# Patient Record
Sex: MALE | Race: WHITE | NOT HISPANIC OR LATINO
[De-identification: names, ages, dates, MRNs, and addresses within clinical notes are randomized per-mention and may not be internally consistent; named-entity substitution may affect disease eponyms.]

---

## 2019-03-27 ENCOUNTER — APPOINTMENT (OUTPATIENT)
Dept: INTERNAL MEDICINE | Facility: CLINIC | Age: 54
End: 2019-03-27

## 2021-11-19 ENCOUNTER — APPOINTMENT (OUTPATIENT)
Dept: CARDIOLOGY | Facility: CLINIC | Age: 56
End: 2021-11-19
Payer: COMMERCIAL

## 2021-11-19 ENCOUNTER — NON-APPOINTMENT (OUTPATIENT)
Age: 56
End: 2021-11-19

## 2021-11-19 VITALS
SYSTOLIC BLOOD PRESSURE: 149 MMHG | HEART RATE: 79 BPM | BODY MASS INDEX: 37.18 KG/M2 | WEIGHT: 251 LBS | DIASTOLIC BLOOD PRESSURE: 99 MMHG | HEIGHT: 69 IN | RESPIRATION RATE: 16 BRPM

## 2021-11-19 DIAGNOSIS — Z86.79 PERSONAL HISTORY OF OTHER DISEASES OF THE CIRCULATORY SYSTEM: ICD-10-CM

## 2021-11-19 DIAGNOSIS — Z78.9 OTHER SPECIFIED HEALTH STATUS: ICD-10-CM

## 2021-11-19 DIAGNOSIS — Z82.49 FAMILY HISTORY OF ISCHEMIC HEART DISEASE AND OTHER DISEASES OF THE CIRCULATORY SYSTEM: ICD-10-CM

## 2021-11-19 PROCEDURE — 93000 ELECTROCARDIOGRAM COMPLETE: CPT

## 2021-11-19 PROCEDURE — 99204 OFFICE O/P NEW MOD 45 MIN: CPT

## 2021-12-01 ENCOUNTER — APPOINTMENT (OUTPATIENT)
Dept: CARDIOLOGY | Facility: CLINIC | Age: 56
End: 2021-12-01
Payer: COMMERCIAL

## 2021-12-01 PROCEDURE — 93306 TTE W/DOPPLER COMPLETE: CPT

## 2022-01-03 ENCOUNTER — APPOINTMENT (OUTPATIENT)
Dept: CARDIOLOGY | Facility: CLINIC | Age: 57
End: 2022-01-03

## 2022-01-04 ENCOUNTER — APPOINTMENT (OUTPATIENT)
Dept: CARDIOLOGY | Facility: CLINIC | Age: 57
End: 2022-01-04

## 2022-02-07 ENCOUNTER — APPOINTMENT (OUTPATIENT)
Dept: CARDIOLOGY | Facility: CLINIC | Age: 57
End: 2022-02-07
Payer: COMMERCIAL

## 2022-02-07 PROCEDURE — A9500: CPT

## 2022-02-07 PROCEDURE — 78452 HT MUSCLE IMAGE SPECT MULT: CPT

## 2022-02-09 ENCOUNTER — APPOINTMENT (OUTPATIENT)
Dept: CARDIOLOGY | Facility: CLINIC | Age: 57
End: 2022-02-09

## 2022-02-11 ENCOUNTER — APPOINTMENT (OUTPATIENT)
Dept: CARDIOLOGY | Facility: CLINIC | Age: 57
End: 2022-02-11
Payer: COMMERCIAL

## 2022-02-11 ENCOUNTER — NON-APPOINTMENT (OUTPATIENT)
Age: 57
End: 2022-02-11

## 2022-02-11 VITALS
SYSTOLIC BLOOD PRESSURE: 134 MMHG | DIASTOLIC BLOOD PRESSURE: 94 MMHG | RESPIRATION RATE: 16 BRPM | HEART RATE: 75 BPM | WEIGHT: 252 LBS | HEIGHT: 69 IN | BODY MASS INDEX: 37.33 KG/M2

## 2022-02-11 DIAGNOSIS — I77.810 THORACIC AORTIC ECTASIA: ICD-10-CM

## 2022-02-11 DIAGNOSIS — R94.31 ABNORMAL ELECTROCARDIOGRAM [ECG] [EKG]: ICD-10-CM

## 2022-02-11 PROCEDURE — 99214 OFFICE O/P EST MOD 30 MIN: CPT

## 2022-02-11 PROCEDURE — 93000 ELECTROCARDIOGRAM COMPLETE: CPT

## 2022-02-11 NOTE — DISCUSSION/SUMMARY
[FreeTextEntry1] : 1).  Patient's echocardiogram revealed some mild aortic root/ascending aorta dilation with impaired diastolic filling.  There was preserved LV function and no significant valvulopathy noted.\par \par His at-home blood pressures are apparently much better averaging in the 120s over 70s since beginning Irebesartan-HCTZ 150-12.5 mg back in November.  Today's in-office BP elevated but he reports having an element of white coat hypertension.\par \par Been compliant with increased Carvedilol 20 mg daily as well.\par \par Continue with current medical regimen, will consider titrating up Irbesartan-HCTZ if BP remains elevated at follow up.  Continue to monitor BP at home 3 x week, approximately 2-3 hours after taking A.M. meds and bring log to f/u.  Bring home BP cuff to follow up for calibration as well.\par \par 2).  Patient reassured the nuclear stress test did not reveal any signs of coronary ischemia and the 1 week Event monitor did not reveal any significant arrhythmias.  Resting blood pressure was elevated while off Carvedilol at (142/100).\par \par 3).  Diet and lifestyle modification discussed including low sodium, low fat and low carbohydrate weight reducing diet.  Patient is to implement aerobic exercise regimen few days per week as tolerated. \par \par 4).  Follow up with PCP (Dr. Ortega) regarding routine checkups and blood work.  Forward all testing/lab work to our office. \par \par 5).  No additional cardiac testing indicated at this time. \par \par 6).  Recommend patient report any untoward symptoms. \par \par 7).  Follow up with Dr. Lorenzo 3-4 months or PRN.

## 2022-02-11 NOTE — HISTORY OF PRESENT ILLNESS
[FreeTextEntry1] : Tolerating current cardiac medical regimen without difficulty including Irbesartan-HCTZ 150-12.5 mg QD and Carvedilol 20 mg QD;\par \par Suffers from multiple somatic complaints which includes prior spinal fusion surgery, chronic severe left shoulder pain and bilateral torn ACLs after sustaining work injury where he fell over 8 feet through a drop ceiling.  Will consider Orthopedic follow up to discuss shoulder and knee intervention;\par \par 1 week Event monitor (11/12 to 11/29/21):  Presenting rhythm was sinus with average heart rate 77 bpm (Max 120, Min 51).  There were no episodes of atrial fibrillation, SVT, VT, pauses or AV blocks.  There were occasional PVCs and PACs with <1% burden.  There were 3 patient events recorded for (heart racing) which correlated with NSR in the 70s to 90s;\par \par Transthoracic Echocardiogram (12/1/2021):  Mild ascending aorta dilation (4.0 cm) and aortic root dilation (3.7 cm). Normal LV systolic function with an LVEF of 55 to 60%.  There is impaired relaxation pattern of LV diastolic filling.  Mild to moderate AR. The left and right atria normal in size and structure. Trace MR and ME;\par \par Pharmacologic Nuclear Stress test (2/7/2022):  Patient developed SOB during stress that resolved with IV aminophylline.  Resting blood pressure was (142/100) while off Carvedilol.  Patient developed no dysrhythmias during exercise. EKG was negative for ischemia. Normal myocardial perfusion imaging with artifact; LVEF of 53%. ie- negative study;

## 2022-02-11 NOTE — ASSESSMENT
[FreeTextEntry1] : EKG 2/11/2022:  The EKG illustrates sinus rhythm, rate of 75 bpm, nonspecific T wave abnormality.  Essentially unchanged.

## 2022-02-11 NOTE — REASON FOR VISIT
[FreeTextEntry1] : Patient is a 56-year-old male with longstanding history of intermittent palpitations of the chest and longstanding history of hypertension. He had a workup many years ago (9-10 years) for his symptoms and abnormal EKG and apparently had a cardiac catheterization which demonstrated (normal coronary arteries). There is a strong family history for heart disease especially for his father who  of age 42 for an apparent MI;\par \par He came to our office last November for consultation in regards to increasing palpitations and elevated blood pressures.  Admits his palpitations have improved considerably since that time after increasing daily hydration.  Also, his PCP started him on Irbesartan-HCTZ 150-12.5 mg daily with recent home blood pressures averaging in the 120s over 70s;\par \par Patient completed 1 week Event monitor, Transthoracic Echocardiogram and Nuclear Stress test and is back today to review those results;\par \par He denies CP, SOB, ANTHONY, presyncope, syncope, edema.

## 2022-02-11 NOTE — PHYSICAL EXAM
[Well Developed] : well developed [No Acute Distress] : no acute distress [Obese] : obese [Normal Conjunctiva] : normal conjunctiva [Normal Venous Pressure] : normal venous pressure [No Carotid Bruit] : no carotid bruit [Normal S1, S2] : normal S1, S2 [No Rub] : no rub [No Gallop] : no gallop [Murmur] : murmur [Clear Lung Fields] : clear lung fields [Good Air Entry] : good air entry [No Respiratory Distress] : no respiratory distress  [Soft] : abdomen soft [Non Tender] : non-tender [No Masses/organomegaly] : no masses/organomegaly [Normal Gait] : normal gait [No Edema] : no edema [No Cyanosis] : no cyanosis [No Clubbing] : no clubbing [No Rash] : no rash [No Skin Lesions] : no skin lesions [Moves all extremities] : moves all extremities [No Focal Deficits] : no focal deficits [Normal Speech] : normal speech [Alert and Oriented] : alert and oriented [Normal memory] : normal memory [de-identified] : Grade I/VI systolic murmur

## 2022-05-12 RX ORDER — KIT FOR THE PREPARATION OF TECHNETIUM TC99M SESTAMIBI 1 MG/5ML
INJECTION, POWDER, LYOPHILIZED, FOR SOLUTION PARENTERAL
Refills: 0 | Status: COMPLETED | OUTPATIENT
Start: 2022-05-12

## 2022-05-12 RX ADMIN — KIT FOR THE PREPARATION OF TECHNETIUM TC99M SESTAMIBI 0: 1 INJECTION, POWDER, LYOPHILIZED, FOR SOLUTION PARENTERAL at 00:00

## 2022-06-03 ENCOUNTER — APPOINTMENT (OUTPATIENT)
Dept: CARDIOLOGY | Facility: CLINIC | Age: 57
End: 2022-06-03

## 2023-01-03 ENCOUNTER — EMERGENCY (EMERGENCY)
Facility: HOSPITAL | Age: 58
LOS: 1 days | Discharge: HOME | End: 2023-01-03
Attending: EMERGENCY MEDICINE
Payer: COMMERCIAL

## 2023-01-03 VITALS
WEIGHT: 258.16 LBS | HEART RATE: 72 BPM | OXYGEN SATURATION: 99 % | HEIGHT: 69 IN | DIASTOLIC BLOOD PRESSURE: 100 MMHG | SYSTOLIC BLOOD PRESSURE: 192 MMHG | RESPIRATION RATE: 18 BRPM | TEMPERATURE: 98 F

## 2023-01-03 VITALS
RESPIRATION RATE: 18 BRPM | SYSTOLIC BLOOD PRESSURE: 133 MMHG | HEART RATE: 62 BPM | TEMPERATURE: 98 F | DIASTOLIC BLOOD PRESSURE: 73 MMHG | OXYGEN SATURATION: 99 %

## 2023-01-03 LAB
ALBUMIN SERPL ELPH-MCNC: 4.5 G/DL — SIGNIFICANT CHANGE UP (ref 3.3–5.2)
ALP SERPL-CCNC: 110 U/L — SIGNIFICANT CHANGE UP (ref 40–120)
ALT FLD-CCNC: 53 U/L — HIGH
ANION GAP SERPL CALC-SCNC: 11 MMOL/L — SIGNIFICANT CHANGE UP (ref 5–17)
AST SERPL-CCNC: 40 U/L — HIGH
BASOPHILS # BLD AUTO: 0.02 K/UL — SIGNIFICANT CHANGE UP (ref 0–0.2)
BASOPHILS NFR BLD AUTO: 0.3 % — SIGNIFICANT CHANGE UP (ref 0–2)
BILIRUB SERPL-MCNC: 0.5 MG/DL — SIGNIFICANT CHANGE UP (ref 0.4–2)
BUN SERPL-MCNC: 15.6 MG/DL — SIGNIFICANT CHANGE UP (ref 8–20)
CALCIUM SERPL-MCNC: 9.3 MG/DL — SIGNIFICANT CHANGE UP (ref 8.4–10.5)
CHLORIDE SERPL-SCNC: 104 MMOL/L — SIGNIFICANT CHANGE UP (ref 96–108)
CO2 SERPL-SCNC: 26 MMOL/L — SIGNIFICANT CHANGE UP (ref 22–29)
CREAT SERPL-MCNC: 1.11 MG/DL — SIGNIFICANT CHANGE UP (ref 0.5–1.3)
EGFR: 77 ML/MIN/1.73M2 — SIGNIFICANT CHANGE UP
EOSINOPHIL # BLD AUTO: 0.09 K/UL — SIGNIFICANT CHANGE UP (ref 0–0.5)
EOSINOPHIL NFR BLD AUTO: 1.2 % — SIGNIFICANT CHANGE UP (ref 0–6)
GLUCOSE SERPL-MCNC: 96 MG/DL — SIGNIFICANT CHANGE UP (ref 70–99)
HCT VFR BLD CALC: 43.4 % — SIGNIFICANT CHANGE UP (ref 39–50)
HGB BLD-MCNC: 14.9 G/DL — SIGNIFICANT CHANGE UP (ref 13–17)
IMM GRANULOCYTES NFR BLD AUTO: 0.8 % — SIGNIFICANT CHANGE UP (ref 0–0.9)
LYMPHOCYTES # BLD AUTO: 1.6 K/UL — SIGNIFICANT CHANGE UP (ref 1–3.3)
LYMPHOCYTES # BLD AUTO: 21 % — SIGNIFICANT CHANGE UP (ref 13–44)
MCHC RBC-ENTMCNC: 29.4 PG — SIGNIFICANT CHANGE UP (ref 27–34)
MCHC RBC-ENTMCNC: 34.3 GM/DL — SIGNIFICANT CHANGE UP (ref 32–36)
MCV RBC AUTO: 85.6 FL — SIGNIFICANT CHANGE UP (ref 80–100)
MONOCYTES # BLD AUTO: 0.76 K/UL — SIGNIFICANT CHANGE UP (ref 0–0.9)
MONOCYTES NFR BLD AUTO: 10 % — SIGNIFICANT CHANGE UP (ref 2–14)
NEUTROPHILS # BLD AUTO: 5.09 K/UL — SIGNIFICANT CHANGE UP (ref 1.8–7.4)
NEUTROPHILS NFR BLD AUTO: 66.7 % — SIGNIFICANT CHANGE UP (ref 43–77)
PLATELET # BLD AUTO: 148 K/UL — LOW (ref 150–400)
POTASSIUM SERPL-MCNC: 3.9 MMOL/L — SIGNIFICANT CHANGE UP (ref 3.5–5.3)
POTASSIUM SERPL-SCNC: 3.9 MMOL/L — SIGNIFICANT CHANGE UP (ref 3.5–5.3)
PROT SERPL-MCNC: 7 G/DL — SIGNIFICANT CHANGE UP (ref 6.6–8.7)
RBC # BLD: 5.07 M/UL — SIGNIFICANT CHANGE UP (ref 4.2–5.8)
RBC # FLD: 13.7 % — SIGNIFICANT CHANGE UP (ref 10.3–14.5)
SODIUM SERPL-SCNC: 141 MMOL/L — SIGNIFICANT CHANGE UP (ref 135–145)
TROPONIN T SERPL-MCNC: <0.01 NG/ML — SIGNIFICANT CHANGE UP (ref 0–0.06)
TSH SERPL-MCNC: 2.59 UIU/ML — SIGNIFICANT CHANGE UP (ref 0.27–4.2)
WBC # BLD: 7.62 K/UL — SIGNIFICANT CHANGE UP (ref 3.8–10.5)
WBC # FLD AUTO: 7.62 K/UL — SIGNIFICANT CHANGE UP (ref 3.8–10.5)

## 2023-01-03 PROCEDURE — 84443 ASSAY THYROID STIM HORMONE: CPT

## 2023-01-03 PROCEDURE — 80053 COMPREHEN METABOLIC PANEL: CPT

## 2023-01-03 PROCEDURE — 99285 EMERGENCY DEPT VISIT HI MDM: CPT | Mod: 25

## 2023-01-03 PROCEDURE — 71046 X-RAY EXAM CHEST 2 VIEWS: CPT

## 2023-01-03 PROCEDURE — 99285 EMERGENCY DEPT VISIT HI MDM: CPT

## 2023-01-03 PROCEDURE — 71046 X-RAY EXAM CHEST 2 VIEWS: CPT | Mod: 26

## 2023-01-03 PROCEDURE — 84484 ASSAY OF TROPONIN QUANT: CPT

## 2023-01-03 PROCEDURE — 85025 COMPLETE CBC W/AUTO DIFF WBC: CPT

## 2023-01-03 PROCEDURE — 93005 ELECTROCARDIOGRAM TRACING: CPT

## 2023-01-03 PROCEDURE — 93010 ELECTROCARDIOGRAM REPORT: CPT

## 2023-01-03 PROCEDURE — 36415 COLL VENOUS BLD VENIPUNCTURE: CPT

## 2023-01-03 NOTE — ED ADULT NURSE NOTE - NSHOSCREENINGQ1_ED_ALL_ED
----- Message from Reshma Roberson PA-C sent at 3/11/2022 10:35 AM CST -----  BMP- Normal kidney function and blood sugar  Ferritin- normal but low end of normal, recommend multivitamin with iron  FLP- fabulous, repeat in 3-5 years  CBC- no anemia   No

## 2023-01-03 NOTE — ED PROVIDER NOTE - NSFOLLOWUPINSTRUCTIONS_ED_ALL_ED_FT
- Follow up with your doctor within 2-3 days.   - Follow up with Cardiology, call in the morning for an appointment.  - Bring results with you to the appointment.   - Return to the ED for any new or worsening symptoms.     Palpitations    A palpitation is the feeling that your heartbeat is irregular or is faster than normal. It may feel like your heart is fluttering or skipping a beat. They may be caused by many things, including smoking, caffeine, alcohol, stress, and certain medicines. Although most causes of palpitations are not serious, palpitations can be a sign of a serious medical problem. Avoid caffeine, alcohol, and tobacco products at home. Try to reduce stress and anxiety and make sure to get plenty of rest.     SEEK IMMEDIATE MEDICAL CARE IF YOU HAVE ANY OF THE FOLLOWING SYMPTOMS: chest pain, shortness of breath, severe headache, dizziness/lightheadedness, or fainting.

## 2023-01-03 NOTE — ED PROVIDER NOTE - PROGRESS NOTE DETAILS
Placido DE LA TORRE: Patient asymptomatic and remains in NSR.  Reviewed results with patient, shared decision making model employed offered observation stay for cardiac monitoring and cardiology eval versus outpatient cardiology evaluation.  Risk benefits discussed.  Patient and significant other prefer outpatient follow-up.  Has scheduled PMD appointment later this week and will call cardiologist in the morning for close follow-up.  Provided with copy of results and EKG.  Return precautions given.

## 2023-01-03 NOTE — ED ADULT NURSE NOTE - OBJECTIVE STATEMENT
pt reports of palpitations on Fri that went away and came back today while pt was at rest. pt also adds having dizziness. hx of hypertension, denies blood thinner use. denies headache, shortness of breath, swelling of extremities. pt attached to cardiac monitor and pulse ox. family at bedside. gcs15 aox4. sinus rhythm on monitor. HR64

## 2023-01-03 NOTE — ED PROVIDER NOTE - NS ED ROS FT
Constitutional: no fever, no sweats, and no chills.  CV: no chest pain, no edema. +palpitations  Resp: no cough, no dyspnea  GI: no abdominal pain, no nausea and no vomiting.  MSK: no msk pain, no weakness  Skin: no lesions, and no rashes.  Neuro: no LOC, no headache, no dizziness  ROS otherwise negative except as noted in HPI.

## 2023-01-03 NOTE — ED PROVIDER NOTE - OBJECTIVE STATEMENT
58yo M w/pmh palpitations presents for palpitations. Reports an episode for 2 hours on Friday, then again today onset 3pm, still present on arrival. Reports he has had similar symptoms in the past, but usually one episode in several months, never this close together. Denies f/ch, CP, SOB, n/v, abdominal pain. Sees Dr. Lorenzo from [facility] for cardiology, stress and echo last done a year ago, mild AR, otherwise unremarkable. Denies cardiac hx.

## 2023-01-03 NOTE — ED PROVIDER NOTE - ATTENDING CONTRIBUTION TO CARE
Placido: I performed a face to face bedside interview with patient regarding history of present illness, review of symptoms and past medical history. I completed an independent physical exam.  I have discussed patient's plan of care with resident.   I agree with note as stated above including HISTORY OF PRESENT ILLNESS, HIV, PAST MEDICAL/SURGICAL/FAMILY/SOCIAL HISTORY, ALLERGIES AND HOME MEDICATIONS, REVIEW OF SYSTEMS, PHYSICAL EXAM, MEDICAL DECISION MAKING and any PROGRESS NOTES during the time I functioned as the attending physician for this patient unless otherwise noted. My brief assessment is as follows: 57-year-old history of palpitations presents with intermittent Palpitations timesx4 days.  Feels anxious and occasionally lightheaded with the palpitations.  Denies chest pain, shortness of breath, nausea, vomiting, abdominal pain, diaphoresis.  Sees Dr. Lorenzo from cardiology, negative stress echo and Holter last year.  Baseline abnormal EKG with T wave inversions V1 to 3 in 2009, today with T wave inversions V1 through 6.  Patient states he always has abnormalities but does not know if this is new or old.   Gen: Well appearing in NAD  Head: NC/AT  Neck: trachea midline  Resp:  No distress, CTAB  CV: RRR  GI: soft, NTND  Ext: no deformities, no calf ttp, no LE edema  Neuro:  A&O appears non focal  Skin:  Warm and dry as visualized  Psych:  Normal affect and mood  Patient with palpitations currently normal sinus rhythm.  Lab, cardiac monitor, troponin, chest x-ray and reassess.

## 2023-01-03 NOTE — ED PROVIDER NOTE - PATIENT PORTAL LINK FT
You can access the FollowMyHealth Patient Portal offered by [facility] by registering at the following website: [email or web address]. By joining [facility]’s FollowMyHealth portal, you will also be able to view your health information using other applications (apps) compatible with our system.

## 2023-01-03 NOTE — ED PROVIDER NOTE - CLINICAL SUMMARY MEDICAL DECISION MAKING FREE TEXT BOX
58yo M w/pmh palpitations presents for palpitations. EKG shows new T wave inversions compared to previous EKG from 2009, no more recent EKG found. Labs, CXR pending.

## 2023-01-05 ENCOUNTER — NON-APPOINTMENT (OUTPATIENT)
Age: 58
End: 2023-01-05

## 2023-01-05 ENCOUNTER — APPOINTMENT (OUTPATIENT)
Dept: CARDIOLOGY | Facility: CLINIC | Age: 58
End: 2023-01-05
Payer: COMMERCIAL

## 2023-01-05 VITALS
HEART RATE: 73 BPM | OXYGEN SATURATION: 99 % | WEIGHT: 257 LBS | RESPIRATION RATE: 16 BRPM | SYSTOLIC BLOOD PRESSURE: 154 MMHG | HEIGHT: 69 IN | BODY MASS INDEX: 38.06 KG/M2 | DIASTOLIC BLOOD PRESSURE: 90 MMHG

## 2023-01-05 DIAGNOSIS — R00.2 PALPITATIONS: ICD-10-CM

## 2023-01-05 PROCEDURE — 93000 ELECTROCARDIOGRAM COMPLETE: CPT

## 2023-01-05 PROCEDURE — 99215 OFFICE O/P EST HI 40 MIN: CPT | Mod: 25

## 2023-01-05 RX ORDER — NAPROXEN 500 MG/1
500 TABLET ORAL
Refills: 0 | Status: DISCONTINUED | COMMUNITY
End: 2023-01-05

## 2023-01-05 RX ORDER — METAXALONE 800 MG/1
800 TABLET ORAL 3 TIMES DAILY
Refills: 0 | Status: DISCONTINUED | COMMUNITY
End: 2023-01-05

## 2023-01-05 NOTE — ASSESSMENT
[FreeTextEntry1] : 57-year-old male with longstanding history of intermittent palpitations of the chest and longstanding history of hypertension. He had a workup many years ago (9-10 years) for his symptoms and abnormal EKG and apparently had a cardiac catheterization which demonstrated (normal coronary arteries). There is a strong family history for heart disease especially for his father who  of age 42 for an apparent MI;\par \par Patient comes to the office for a HFU, he was recently seen at [facility] with complains of palpitations which he endorses to high consumption of coffee around the holidays\par \par Patient denies chest pain, no palpitations, no ANTHONY, no PND, no orthopnea, no leg edema,  no claudication, no syncope.\par \par \par #Hospital follow up\par asymptomatic \par \par #HTN \par well controlled\par continue with current medications \par Patient instructed to monitor BP at home and bring log to f/u.\par \par #PVCs\par with ongoing palpitations, old problem chronic \par \par RTC in 4 months

## 2023-01-05 NOTE — HISTORY OF PRESENT ILLNESS
[FreeTextEntry1] : 57-year-old male with longstanding history of intermittent palpitations of the chest and longstanding history of hypertension. He had a workup many years ago (9-10 years) for his symptoms and abnormal EKG and apparently had a cardiac catheterization which demonstrated (normal coronary arteries). There is a strong family history for heart disease especially for his father who  of age 42 for an apparent MI;\par \par Patient comes to the office for a HFU, he was recently seen at [facility] with complains of palpitations which he endorses to high consumption of coffee around the holidays\par \par Patient denies chest pain, no palpitations, no ANTHONY, no PND, no orthopnea, no leg edema,  no claudication, no syncope.\par  \par

## 2023-01-05 NOTE — CARDIOLOGY SUMMARY
[de-identified] : 1/5/22: TORI ALVAREZ v3 [de-identified] : (12/1/2021): Mild ascending aorta dilation (4.0 cm) and aortic root dilation (3.7 cm). Normal LV systolic function with an LVEF of 55 to 60%. There is impaired relaxation pattern of LV diastolic filling. Mild to moderate AR. The left and right atria normal in size and structure. Trace MR and IA;\par  [de-identified] : Pharmacologic Nuclear Stress test (2/7/2022): Patient developed SOB during stress that resolved with IV aminophylline. Resting blood pressure was (142/100) while off Carvedilol. Patient developed no dysrhythmias during exercise. EKG was negative for ischemia. Normal myocardial perfusion imaging with artifact; LVEF of 53%. ie- negative study;  [de-identified] : (12/1/2021): Mild ascending aorta dilation (4.0 cm) and aortic root dilation (3.7 cm). Normal LV systolic function with an LVEF of 55 to 60%. There is impaired relaxation pattern of LV diastolic filling. Mild to moderate AR. The left and right atria normal in size and structure. Trace MR and VA;\par

## 2023-01-06 ENCOUNTER — APPOINTMENT (OUTPATIENT)
Dept: FAMILY MEDICINE | Facility: CLINIC | Age: 58
End: 2023-01-06
Payer: COMMERCIAL

## 2023-01-06 ENCOUNTER — NON-APPOINTMENT (OUTPATIENT)
Age: 58
End: 2023-01-06

## 2023-01-06 VITALS
TEMPERATURE: 97.2 F | HEART RATE: 74 BPM | OXYGEN SATURATION: 98 % | SYSTOLIC BLOOD PRESSURE: 150 MMHG | DIASTOLIC BLOOD PRESSURE: 90 MMHG | RESPIRATION RATE: 14 BRPM

## 2023-01-06 DIAGNOSIS — Z23 ENCOUNTER FOR IMMUNIZATION: ICD-10-CM

## 2023-01-06 DIAGNOSIS — Z00.00 ENCOUNTER FOR GENERAL ADULT MEDICAL EXAMINATION W/OUT ABNORMAL FINDINGS: ICD-10-CM

## 2023-01-06 DIAGNOSIS — I49.3 VENTRICULAR PREMATURE DEPOLARIZATION: ICD-10-CM

## 2023-01-06 PROCEDURE — 90714 TD VACC NO PRESV 7 YRS+ IM: CPT

## 2023-01-06 PROCEDURE — 36415 COLL VENOUS BLD VENIPUNCTURE: CPT

## 2023-01-06 PROCEDURE — 90471 IMMUNIZATION ADMIN: CPT

## 2023-01-06 PROCEDURE — G0444 DEPRESSION SCREEN ANNUAL: CPT | Mod: 59

## 2023-01-06 PROCEDURE — 99386 PREV VISIT NEW AGE 40-64: CPT | Mod: 25

## 2023-01-06 NOTE — HEALTH RISK ASSESSMENT
[Never] : Never [No] : In the past 12 months have you used drugs other than those required for medical reasons? No [0] : 2) Feeling down, depressed, or hopeless: Not at all (0) [PHQ-2 Negative - No further assessment needed] : PHQ-2 Negative - No further assessment needed [With Family] : lives with family [Retired] : retired [Significant Other] : lives with significant other [# Of Children ___] : has [unfilled] children [[id number]] : 0

## 2023-01-06 NOTE — HISTORY OF PRESENT ILLNESS
[FreeTextEntry1] : Pt is here for establish care. [de-identified] : 58 y/o male with pmhx of HTN presents for new patient CPE. Patient recently went to [facility] for palpitatons. Has history of PVCs and was drinking a lot of caffeine around the holidays.

## 2023-01-06 NOTE — ASSESSMENT
[FreeTextEntry1] : CPE:\par -will order CBC w/ diff, CMP, Lipid, TSH and HgbA1c, labs to be drawn in office\par \par Screening:\par -Colon Cancer screening: had with Dr. Ortega, will obtain records\par \par Vaccinations:\par Seasonal flu  - recommended, declined\par Tdap - administered today, tolerated well\par Shingles - recommended, declined\par \par -previous notes and labs reviewed, consultants notes reviewed\par -patient expressed understanding of plan, all questions answered\par \par HTN:\par patient with BP of 132/88 at today's visit\par -will continue current medication regimen of carvedilol ER 20mg daily and irbesartan-hctz 150-12.5mg daily \par -Patient advised to continue following a healthy meal plan with less salt and more fruits and vegetables, in addition to increasing physical activity.\par -checks BP at home and gets numbers in 120/80 - 130/80\par -patient to follow up in 6 months or as needed.\par \par PVCs\par chronic, stable\par follow up with cardio as needed

## 2023-01-06 NOTE — REVIEW OF SYSTEMS
[Fever] : no fever [Chills] : no chills [Vision Problems] : no vision problems [Chest Pain] : no chest pain [Palpitations] : no palpitations [Lower Ext Edema] : no lower extremity edema [Shortness Of Breath] : no shortness of breath [Wheezing] : no wheezing [Cough] : no cough [Abdominal Pain] : no abdominal pain [Nausea] : no nausea [Constipation] : no constipation [Diarrhea] : no diarrhea [Vomiting] : no vomiting [Dysuria] : no dysuria [Hematuria] : no hematuria [Headache] : no headache [Dizziness] : no dizziness [Anxiety] : no anxiety [Depression] : no depression

## 2023-01-09 ENCOUNTER — NON-APPOINTMENT (OUTPATIENT)
Age: 58
End: 2023-01-09

## 2023-01-09 LAB
ALBUMIN SERPL ELPH-MCNC: 4.1 G/DL
ALP BLD-CCNC: 145 U/L
ALT SERPL-CCNC: 48 U/L
ANION GAP SERPL CALC-SCNC: 13 MMOL/L
AST SERPL-CCNC: 32 U/L
BASOPHILS # BLD AUTO: 0.03 K/UL
BASOPHILS NFR BLD AUTO: 0.5 %
BILIRUB SERPL-MCNC: 0.3 MG/DL
BUN SERPL-MCNC: 19 MG/DL
CALCIUM SERPL-MCNC: 9.5 MG/DL
CHLORIDE SERPL-SCNC: 103 MMOL/L
CHOLEST SERPL-MCNC: 201 MG/DL
CO2 SERPL-SCNC: 24 MMOL/L
CREAT SERPL-MCNC: 1.16 MG/DL
EGFR: 73 ML/MIN/1.73M2
EOSINOPHIL # BLD AUTO: 0.09 K/UL
EOSINOPHIL NFR BLD AUTO: 1.5 %
ESTIMATED AVERAGE GLUCOSE: 126 MG/DL
GLUCOSE SERPL-MCNC: 115 MG/DL
HBA1C MFR BLD HPLC: 6 %
HCT VFR BLD CALC: 45.2 %
HDLC SERPL-MCNC: 39 MG/DL
HGB BLD-MCNC: 14.7 G/DL
IMM GRANULOCYTES NFR BLD AUTO: 1 %
LDLC SERPL CALC-MCNC: 132 MG/DL
LYMPHOCYTES # BLD AUTO: 0.94 K/UL
LYMPHOCYTES NFR BLD AUTO: 15.5 %
MAN DIFF?: NORMAL
MCHC RBC-ENTMCNC: 29.1 PG
MCHC RBC-ENTMCNC: 32.5 GM/DL
MCV RBC AUTO: 89.3 FL
MONOCYTES # BLD AUTO: 0.63 K/UL
MONOCYTES NFR BLD AUTO: 10.4 %
NEUTROPHILS # BLD AUTO: 4.31 K/UL
NEUTROPHILS NFR BLD AUTO: 71.1 %
NONHDLC SERPL-MCNC: 162 MG/DL
PLATELET # BLD AUTO: 144 K/UL
POTASSIUM SERPL-SCNC: 4.1 MMOL/L
PROT SERPL-MCNC: 6.8 G/DL
RBC # BLD: 5.06 M/UL
RBC # FLD: 14.1 %
SODIUM SERPL-SCNC: 140 MMOL/L
TRIGL SERPL-MCNC: 149 MG/DL
TSH SERPL-ACNC: 2.49 UIU/ML
WBC # FLD AUTO: 6.06 K/UL

## 2023-01-09 RX ORDER — ROSUVASTATIN CALCIUM 5 MG/1
5 TABLET, FILM COATED ORAL
Qty: 90 | Refills: 0 | Status: DISCONTINUED | COMMUNITY
Start: 2022-08-25

## 2023-01-20 ENCOUNTER — OFFICE (OUTPATIENT)
Dept: URBAN - METROPOLITAN AREA CLINIC 109 | Facility: CLINIC | Age: 58
Setting detail: OPHTHALMOLOGY
End: 2023-01-20
Payer: COMMERCIAL

## 2023-01-20 DIAGNOSIS — H25.13: ICD-10-CM

## 2023-01-20 DIAGNOSIS — H25.12: ICD-10-CM

## 2023-01-20 DIAGNOSIS — H40.013: ICD-10-CM

## 2023-01-20 PROCEDURE — 99214 OFFICE O/P EST MOD 30 MIN: CPT | Performed by: OPHTHALMOLOGY

## 2023-01-20 PROCEDURE — 92136 OPHTHALMIC BIOMETRY: CPT | Performed by: OPHTHALMOLOGY

## 2023-01-20 ASSESSMENT — REFRACTION_CURRENTRX
OD_AXIS: 98
OS_CYLINDER: -0.50
OS_AXIS: 88
OD_CYLINDER: -0.50
OS_OVR_VA: 20/
OD_OVR_VA: 20/
OD_SPHERE: -0.50
OS_SPHERE: -0.25

## 2023-01-20 ASSESSMENT — REFRACTION_MANIFEST
OS_AXIS: 94
OD_CYLINDER: -0.75
OS_SPHERE: +0.50
OD_AXIS: 66
OD_SPHERE: +0.25
OS_CYLINDER: -0.50

## 2023-01-20 ASSESSMENT — KERATOMETRY
OD_K1POWER_DIOPTERS: 43.00
OD_AXISANGLE2_DEGREES: 153
OS_AXISANGLE_DEGREES: 13
OD_K2POWER_DIOPTERS: 43.62
OS_K1POWER_DIOPTERS: 42.62
OS_AXISANGLE_DEGREES: 103
OS_CYLPOWER_DEGREES: 0.63
OD_AXISANGLE_DEGREES: 153
OS_K2POWER_DIOPTERS: 43.25
OS_CYLAXISANGLE_DEGREES: 13
OS_K1POWER_DIOPTERS: 42.62
OD_AXISANGLE_DEGREES: 63
OD_K1K2_AVERAGE: 43.31
OD_CYLPOWER_DEGREES: 0.62
OD_K2POWER_DIOPTERS: 43.62
OS_K2POWER_DIOPTERS: 43.25
OS_AXISANGLE2_DEGREES: 13
OD_K1POWER_DIOPTERS: 43.00
OD_CYLAXISANGLE_DEGREES: 153
OS_K1K2_AVERAGE: 42.935

## 2023-01-20 ASSESSMENT — CONFRONTATIONAL VISUAL FIELD TEST (CVF)
OS_FINDINGS: FULL
OD_FINDINGS: FULL

## 2023-01-20 ASSESSMENT — SPHEQUIV_DERIVED
OD_SPHEQUIV: -0.125
OS_SPHEQUIV: 0.25
OD_SPHEQUIV: -0.125
OS_SPHEQUIV: 0.125

## 2023-01-20 ASSESSMENT — AXIALLENGTH_DERIVED
OS_AL: 23.7518
OD_AL: 23.711
OD_AL: 23.711
OS_AL: 23.7025

## 2023-01-20 ASSESSMENT — TONOMETRY
OD_IOP_MMHG: 20
OS_IOP_MMHG: 20

## 2023-01-20 ASSESSMENT — REFRACTION_AUTOREFRACTION
OD_AXIS: 76
OD_CYLINDER: -0.75
OS_AXIS: 86
OS_SPHERE: +0.50
OD_SPHERE: +0.25
OS_CYLINDER: -0.75

## 2023-01-20 ASSESSMENT — VISUAL ACUITY
OD_BCVA: 20/30
OS_BCVA: 20/30-2

## 2023-01-30 ENCOUNTER — APPOINTMENT (OUTPATIENT)
Dept: FAMILY MEDICINE | Facility: CLINIC | Age: 58
End: 2023-01-30
Payer: COMMERCIAL

## 2023-01-30 VITALS
BODY MASS INDEX: 37.03 KG/M2 | OXYGEN SATURATION: 99 % | SYSTOLIC BLOOD PRESSURE: 134 MMHG | TEMPERATURE: 97.6 F | HEIGHT: 69 IN | HEART RATE: 73 BPM | DIASTOLIC BLOOD PRESSURE: 80 MMHG | WEIGHT: 250 LBS | RESPIRATION RATE: 14 BRPM

## 2023-01-30 DIAGNOSIS — H26.9 UNSPECIFIED CATARACT: ICD-10-CM

## 2023-01-30 PROCEDURE — 99214 OFFICE O/P EST MOD 30 MIN: CPT | Mod: 25

## 2023-01-30 PROCEDURE — 36415 COLL VENOUS BLD VENIPUNCTURE: CPT

## 2023-01-31 ENCOUNTER — TRANSCRIPTION ENCOUNTER (OUTPATIENT)
Age: 58
End: 2023-01-31

## 2023-01-31 LAB
25(OH)D3 SERPL-MCNC: 20.6 NG/ML
ALBUMIN SERPL ELPH-MCNC: 4.7 G/DL
ALP BLD-CCNC: 117 U/L
ALT SERPL-CCNC: 52 U/L
ANION GAP SERPL CALC-SCNC: 12 MMOL/L
AST SERPL-CCNC: 48 U/L
BASOPHILS # BLD AUTO: 0.02 K/UL
BASOPHILS NFR BLD AUTO: 0.4 %
BILIRUB SERPL-MCNC: 0.6 MG/DL
BUN SERPL-MCNC: 19 MG/DL
CALCIUM SERPL-MCNC: 9.7 MG/DL
CHLORIDE SERPL-SCNC: 103 MMOL/L
CO2 SERPL-SCNC: 25 MMOL/L
CREAT SERPL-MCNC: 1.15 MG/DL
EGFR: 74 ML/MIN/1.73M2
EOSINOPHIL # BLD AUTO: 0.05 K/UL
EOSINOPHIL NFR BLD AUTO: 1 %
FOLATE SERPL-MCNC: 16.4 NG/ML
GLUCOSE SERPL-MCNC: 103 MG/DL
HCT VFR BLD CALC: 44.7 %
HGB BLD-MCNC: 14.8 G/DL
IMM GRANULOCYTES NFR BLD AUTO: 0.4 %
LYMPHOCYTES # BLD AUTO: 1.01 K/UL
LYMPHOCYTES NFR BLD AUTO: 19.6 %
MAN DIFF?: NORMAL
MCHC RBC-ENTMCNC: 29.4 PG
MCHC RBC-ENTMCNC: 33.1 GM/DL
MCV RBC AUTO: 88.7 FL
MONOCYTES # BLD AUTO: 0.57 K/UL
MONOCYTES NFR BLD AUTO: 11 %
NEUTROPHILS # BLD AUTO: 3.49 K/UL
NEUTROPHILS NFR BLD AUTO: 67.6 %
PLATELET # BLD AUTO: 146 K/UL
POTASSIUM SERPL-SCNC: 4.3 MMOL/L
PROT SERPL-MCNC: 7.1 G/DL
RBC # BLD: 5.04 M/UL
RBC # FLD: 13.5 %
SODIUM SERPL-SCNC: 140 MMOL/L
VIT B12 SERPL-MCNC: 425 PG/ML
WBC # FLD AUTO: 5.16 K/UL

## 2023-01-31 NOTE — ASSESSMENT
[Patient Optimized for Surgery] : Patient optimized for surgery [FreeTextEntry4] : thrombocytopenia on lab work from 1/6/2023 - will repeat CBC with imporved platelet count of 146, no acute signs/symptoms of bleeding\par \par Patient is low-intermediate risk for low risk procedure. Patient is medically optimized at the time of this visit with no modifiable risk factors [FreeTextEntry7] : hold off on nsaids 5-7 days prior to procedure

## 2023-01-31 NOTE — HISTORY OF PRESENT ILLNESS
[No Adverse Anesthesia Reaction] : no adverse anesthesia reaction in self or family member [(Patient denies any chest pain, claudication, dyspnea on exertion, orthopnea, palpitations or syncope)] : Patient denies any chest pain, claudication, dyspnea on exertion, orthopnea, palpitations or syncope [Moderate (4-6 METs)] : Moderate (4-6 METs) [Aortic Stenosis] : no aortic stenosis [Atrial Fibrillation] : no atrial fibrillation [Coronary Artery Disease] : no coronary artery disease [Recent Myocardial Infarction] : no recent myocardial infarction [Implantable Device/Pacemaker] : no implantable device/pacemaker [Asthma] : no asthma [COPD] : no COPD [Sleep Apnea] : no sleep apnea [Smoker] : not a smoker [Chronic Anticoagulation] : no chronic anticoagulation [Chronic Kidney Disease] : no chronic kidney disease [Diabetes] : no diabetes [FreeTextEntry1] : cataract surgery  [FreeTextEntry2] : 2/2/23 and 2/16/23 [FreeTextEntry3] : Dr. Dolan [FreeTextEntry4] : 56 y/o male with pmhx of HTN presents for preop evaluation. Patient will be having left eye cataract surgery on 2/2/23 and then the right eye cataract surgery on 2/16/23 with Dr. Dolan at [facility]. [FreeTextEntry7] : Stress test 2/2022: Negative for ischemia\par EKG 1/5/2023: NSR, with nonspecific t wave abnormalities, unchanged from prior

## 2023-01-31 NOTE — PLAN
Patient Education     Wound Care  Taking proper care of your wound will help it heal. Your healthcare provider may show you how to clean and dress the wound. He or she will also explain how to tell if the wound is healing normally. If you are unsure of how to take care of the wound, be sure to clarify what dressing to use and how often you should change the bandages. Here are the basic steps.     A wound that's not healing normally may be dark in color or have white streaks.   Wash your hands  Tips for washing your hands include:  · Use liquid soap and lather for 2 minutes. Scrub between your fingers and under your nails.  · Rinse with warm water, keeping your fingers pointing down.  · Use a paper towel to dry your hands and to turn off the faucet.  Remove the used dressing  Here are suggestions for removing the dressing:  · If dressing changes cause you pain, be sure to take your pain medicine as prescribed by your healthcare provider 30 minutes before dressing changes.  · Set up your supplies.  · Put on disposable gloves if you’re dressing a wound for someone else or your wound is infected.  · Loosen the tape by pulling gently toward the wound.  · Gently take off the old dressing. If the dressing is stuck to the wound, moisten it with saline (if available) or clean water.  · If you have a drain or tube in the wound, be careful not to pull on it.  · Remove the dressing 1 layer at a time and put it in a plastic bag. Seal the bag and put it in the trash.  · Remove your gloves.  Inspect and dress the wound  Check the wound carefully:  · Each time you change the dressing, check the wound carefully to be sure it’s healing normally by making sure your wound appears to be pink and moist, and is free of infection.    · Wash your hands again. Put on a new pair of gloves.  · Clean and dress the wound as directed by your healthcare provider or nurse. Don't put anything in the wound that is not prescribed or directed by your  healthcare provider. If you have a drain or tube, be careful not to pull on it. Make sure to secure the drain or tube as well.  · Put all unused supplies in a clean plastic bag. Seal the bag and store it in a clean, dry area between dressing changes.   · Be sure to wash your hands again.  Call your healthcare provider  Call your healthcare provider if you see any of the following signs of a problem:  · Bleeding that soaks the dressing  · Pink fluid weeping from the wound  · Increased drainage or drainage that is yellow, yellow-green, or foul-smelling  · Increased swelling or pain, or redness or swelling in the skin around the wound  · A change in the color of the wound, or if streaks develop in a direction away from the wound  · The area between any stitches opens up  · An increase in the size of the wound  · A fever of 100.4°F (38°C) or higher, or as directed by your healthcare provider  · Chills, increased fatigue, or a loss of appetite      Date Last Reviewed: 7/1/2017  © 2497-6855 [employer]. [address] [place] [address], [place] [address]. All rights reserved. This information is not intended as a substitute for professional medical care. Always follow your healthcare professional's instructions.            [FreeTextEntry1] : hold off on nsaids 5-7 days prior to procedure

## 2023-02-02 ENCOUNTER — AMBULATORY SURGERY CENTER (OUTPATIENT)
Dept: URBAN - METROPOLITAN AREA SURGERY 19 | Facility: SURGERY | Age: 58
Setting detail: OPHTHALMOLOGY
End: 2023-02-02
Payer: COMMERCIAL

## 2023-02-02 DIAGNOSIS — H52.4: ICD-10-CM

## 2023-02-02 DIAGNOSIS — H57.03: ICD-10-CM

## 2023-02-02 DIAGNOSIS — H25.12: ICD-10-CM

## 2023-02-02 PROCEDURE — 66984 XCAPSL CTRC RMVL W/O ECP: CPT | Performed by: OPHTHALMOLOGY

## 2023-02-02 PROCEDURE — V2788P PANOPTIX: Performed by: OPHTHALMOLOGY

## 2023-02-03 ENCOUNTER — RX ONLY (RX ONLY)
Age: 58
End: 2023-02-03

## 2023-02-03 ENCOUNTER — OFFICE (OUTPATIENT)
Dept: URBAN - METROPOLITAN AREA CLINIC 109 | Facility: CLINIC | Age: 58
Setting detail: OPHTHALMOLOGY
End: 2023-02-03
Payer: COMMERCIAL

## 2023-02-03 DIAGNOSIS — Z96.1: ICD-10-CM

## 2023-02-03 PROCEDURE — 99024 POSTOP FOLLOW-UP VISIT: CPT | Performed by: OPHTHALMOLOGY

## 2023-02-03 ASSESSMENT — REFRACTION_MANIFEST
OS_AXIS: 94
OS_SPHERE: +0.50
OS_CYLINDER: -0.50
OD_AXIS: 66
OD_SPHERE: +0.25
OD_CYLINDER: -0.75

## 2023-02-03 ASSESSMENT — KERATOMETRY
OS_AXISANGLE_DEGREES: 13
OS_K1POWER_DIOPTERS: 42.62
OD_K2POWER_DIOPTERS: 43.62
OD_K1POWER_DIOPTERS: 43.00
OS_K2POWER_DIOPTERS: 43.25
OD_AXISANGLE_DEGREES: 153

## 2023-02-03 ASSESSMENT — VISUAL ACUITY
OD_BCVA: 20/20-2
OS_BCVA: 20/30-2

## 2023-02-03 ASSESSMENT — REFRACTION_AUTOREFRACTION
OD_SPHERE: +0.25
OS_SPHERE: +0.50
OD_AXIS: 76
OS_AXIS: 86
OS_CYLINDER: -0.75
OD_CYLINDER: -0.75

## 2023-02-03 ASSESSMENT — CONFRONTATIONAL VISUAL FIELD TEST (CVF)
OS_FINDINGS: FULL
OD_FINDINGS: FULL

## 2023-02-03 ASSESSMENT — TONOMETRY: OS_IOP_MMHG: 20

## 2023-02-03 ASSESSMENT — AXIALLENGTH_DERIVED
OD_AL: 23.711
OD_AL: 23.711
OS_AL: 23.7518
OS_AL: 23.7025

## 2023-02-03 ASSESSMENT — SPHEQUIV_DERIVED
OS_SPHEQUIV: 0.125
OD_SPHEQUIV: -0.125
OS_SPHEQUIV: 0.25
OD_SPHEQUIV: -0.125

## 2023-02-03 ASSESSMENT — REFRACTION_CURRENTRX
OD_SPHERE: -0.50
OD_AXIS: 98
OD_CYLINDER: -0.50
OS_OVR_VA: 20/
OS_AXIS: 88
OD_OVR_VA: 20/
OS_CYLINDER: -0.50
OS_SPHERE: -0.25

## 2023-02-09 ENCOUNTER — OFFICE (OUTPATIENT)
Dept: URBAN - METROPOLITAN AREA CLINIC 109 | Facility: CLINIC | Age: 58
Setting detail: OPHTHALMOLOGY
End: 2023-02-09
Payer: COMMERCIAL

## 2023-02-09 DIAGNOSIS — H25.11: ICD-10-CM

## 2023-02-09 DIAGNOSIS — Z96.1: ICD-10-CM

## 2023-02-09 PROCEDURE — 99024 POSTOP FOLLOW-UP VISIT: CPT | Performed by: OPHTHALMOLOGY

## 2023-02-09 PROCEDURE — 92136 OPHTHALMIC BIOMETRY: CPT | Performed by: OPHTHALMOLOGY

## 2023-02-09 ASSESSMENT — KERATOMETRY
OD_K2POWER_DIOPTERS: 43.62
OS_AXISANGLE_DEGREES: 13
OD_AXISANGLE_DEGREES: 153
OS_K2POWER_DIOPTERS: 43.25
OD_K1POWER_DIOPTERS: 43.00
OS_K1POWER_DIOPTERS: 42.62

## 2023-02-09 ASSESSMENT — REFRACTION_MANIFEST
OS_AXIS: 94
OD_SPHERE: +0.25
OS_CYLINDER: -0.50
OD_AXIS: 66
OS_SPHERE: +0.50
OD_CYLINDER: -0.75

## 2023-02-09 ASSESSMENT — AXIALLENGTH_DERIVED
OD_AL: 23.711
OD_AL: 23.711
OS_AL: 24.0012
OS_AL: 23.7025

## 2023-02-09 ASSESSMENT — REFRACTION_CURRENTRX
OD_CYLINDER: -0.50
OS_AXIS: 88
OS_OVR_VA: 20/
OD_OVR_VA: 20/
OD_AXIS: 98
OS_CYLINDER: -0.50
OS_SPHERE: -0.25
OD_SPHERE: -0.50

## 2023-02-09 ASSESSMENT — REFRACTION_AUTOREFRACTION
OD_CYLINDER: -0.75
OS_AXIS: 104
OS_CYLINDER: -0.50
OD_AXIS: 98
OS_SPHERE: -0.25
OD_SPHERE: +0.25

## 2023-02-09 ASSESSMENT — SPHEQUIV_DERIVED
OS_SPHEQUIV: -0.5
OS_SPHEQUIV: 0.25
OD_SPHEQUIV: -0.125
OD_SPHEQUIV: -0.125

## 2023-02-09 ASSESSMENT — VISUAL ACUITY
OD_BCVA: 20/20-2
OS_BCVA: 20/30

## 2023-02-09 ASSESSMENT — TONOMETRY: OS_IOP_MMHG: 17

## 2023-02-09 ASSESSMENT — CONFRONTATIONAL VISUAL FIELD TEST (CVF)
OD_FINDINGS: FULL
OS_FINDINGS: FULL

## 2023-02-16 ENCOUNTER — AMBULATORY SURGERY CENTER (OUTPATIENT)
Dept: URBAN - METROPOLITAN AREA SURGERY 19 | Facility: SURGERY | Age: 58
Setting detail: OPHTHALMOLOGY
End: 2023-02-16
Payer: COMMERCIAL

## 2023-02-16 DIAGNOSIS — H25.11: ICD-10-CM

## 2023-02-16 DIAGNOSIS — H52.211: ICD-10-CM

## 2023-02-16 PROCEDURE — V2788P PANOPTIX: Performed by: OPHTHALMOLOGY

## 2023-02-16 PROCEDURE — 66984 XCAPSL CTRC RMVL W/O ECP: CPT | Performed by: OPHTHALMOLOGY

## 2023-02-17 ENCOUNTER — RX ONLY (RX ONLY)
Age: 58
End: 2023-02-17

## 2023-02-17 ENCOUNTER — OFFICE (OUTPATIENT)
Dept: URBAN - METROPOLITAN AREA CLINIC 109 | Facility: CLINIC | Age: 58
Setting detail: OPHTHALMOLOGY
End: 2023-02-17
Payer: COMMERCIAL

## 2023-02-17 DIAGNOSIS — Z96.1: ICD-10-CM

## 2023-02-17 PROBLEM — H25.11 CATARACT SENILE NUCLEAR SCLEROSIS;  , RIGHT EYE: Status: ACTIVE | Noted: 2023-02-03

## 2023-02-17 PROCEDURE — 99024 POSTOP FOLLOW-UP VISIT: CPT | Performed by: OPHTHALMOLOGY

## 2023-02-17 ASSESSMENT — SPHEQUIV_DERIVED
OD_SPHEQUIV: -0.125
OD_SPHEQUIV: -0.125
OS_SPHEQUIV: -0.5
OS_SPHEQUIV: 0.25

## 2023-02-17 ASSESSMENT — REFRACTION_CURRENTRX
OD_AXIS: 98
OS_AXIS: 88
OD_SPHERE: -0.50
OS_SPHERE: -0.25
OS_OVR_VA: 20/
OD_CYLINDER: -0.50
OS_CYLINDER: -0.50
OD_OVR_VA: 20/

## 2023-02-17 ASSESSMENT — VISUAL ACUITY
OD_BCVA: 20/20
OS_BCVA: 20/25

## 2023-02-17 ASSESSMENT — REFRACTION_MANIFEST
OD_CYLINDER: -0.75
OS_SPHERE: +0.50
OD_SPHERE: +0.25
OS_CYLINDER: -0.50
OD_AXIS: 66
OS_AXIS: 94

## 2023-02-17 ASSESSMENT — REFRACTION_AUTOREFRACTION
OS_AXIS: 104
OS_CYLINDER: -0.50
OD_AXIS: 98
OD_SPHERE: +0.25
OD_CYLINDER: -0.75
OS_SPHERE: -0.25

## 2023-02-17 ASSESSMENT — KERATOMETRY
OD_K1POWER_DIOPTERS: 43.00
OD_AXISANGLE_DEGREES: 153
OS_K2POWER_DIOPTERS: 43.25
OS_K1POWER_DIOPTERS: 42.62
OD_K2POWER_DIOPTERS: 43.62
OS_AXISANGLE_DEGREES: 13

## 2023-02-17 ASSESSMENT — AXIALLENGTH_DERIVED
OD_AL: 23.711
OS_AL: 24.0012
OD_AL: 23.711
OS_AL: 23.7025

## 2023-02-17 ASSESSMENT — CONFRONTATIONAL VISUAL FIELD TEST (CVF)
OS_FINDINGS: FULL
OD_FINDINGS: FULL

## 2023-03-03 ENCOUNTER — APPOINTMENT (OUTPATIENT)
Dept: FAMILY MEDICINE | Facility: CLINIC | Age: 58
End: 2023-03-03
Payer: COMMERCIAL

## 2023-03-03 VITALS
TEMPERATURE: 97.6 F | RESPIRATION RATE: 14 BRPM | DIASTOLIC BLOOD PRESSURE: 82 MMHG | OXYGEN SATURATION: 99 % | HEART RATE: 72 BPM | SYSTOLIC BLOOD PRESSURE: 122 MMHG

## 2023-03-03 VITALS — BODY MASS INDEX: 36.06 KG/M2 | WEIGHT: 244.2 LBS

## 2023-03-03 PROCEDURE — 36415 COLL VENOUS BLD VENIPUNCTURE: CPT

## 2023-03-03 PROCEDURE — 99214 OFFICE O/P EST MOD 30 MIN: CPT | Mod: 25

## 2023-03-04 NOTE — HISTORY OF PRESENT ILLNESS
[FreeTextEntry1] : Pt is here for platelets f/u. [de-identified] : 56 y/o male with pmhx of HTN, PVCs presents for follow up. Patient was found to have thrombocytopenia on recent lab work. Patient had previoulsy been taking nsaids for pain. He is not currently on nsaids. Patient states he has been making changes to his diet and has lost some weight.

## 2023-03-04 NOTE — ASSESSMENT
[FreeTextEntry1] : Thrombocytopenia\par improving, will check cbc today\par \par Elevated LFTs\par will repeat today, if remains elevated will send for ultrasound\par \par HTN:\par BP stable\par -will continue current medication regimen of carvedilol ER 20mg daily and irbesartan-hctz 150-12.5mg daily \par -Patient advised to continue following a healthy meal plan with less salt and more fruits and vegetables, in addition to increasing physical activity.\par \par PVCs\par chronic, stable\par follow up with cardio as needed

## 2023-03-06 LAB
ALBUMIN SERPL ELPH-MCNC: 4.8 G/DL
ALP BLD-CCNC: 114 U/L
ALT SERPL-CCNC: 51 U/L
ANION GAP SERPL CALC-SCNC: 15 MMOL/L
AST SERPL-CCNC: 41 U/L
BASOPHILS # BLD AUTO: 0.02 K/UL
BASOPHILS NFR BLD AUTO: 0.3 %
BILIRUB SERPL-MCNC: 0.7 MG/DL
BUN SERPL-MCNC: 21 MG/DL
CALCIUM SERPL-MCNC: 9.5 MG/DL
CHLORIDE SERPL-SCNC: 100 MMOL/L
CO2 SERPL-SCNC: 23 MMOL/L
CREAT SERPL-MCNC: 1.24 MG/DL
EGFR: 68 ML/MIN/1.73M2
EOSINOPHIL # BLD AUTO: 0.07 K/UL
EOSINOPHIL NFR BLD AUTO: 1.1 %
GLUCOSE SERPL-MCNC: 109 MG/DL
HCT VFR BLD CALC: 44.3 %
HGB BLD-MCNC: 15.1 G/DL
IMM GRANULOCYTES NFR BLD AUTO: 0.3 %
LYMPHOCYTES # BLD AUTO: 1.28 K/UL
LYMPHOCYTES NFR BLD AUTO: 20.2 %
MAN DIFF?: NORMAL
MCHC RBC-ENTMCNC: 29.7 PG
MCHC RBC-ENTMCNC: 34.1 GM/DL
MCV RBC AUTO: 87 FL
MONOCYTES # BLD AUTO: 0.63 K/UL
MONOCYTES NFR BLD AUTO: 9.9 %
NEUTROPHILS # BLD AUTO: 4.32 K/UL
NEUTROPHILS NFR BLD AUTO: 68.2 %
PLATELET # BLD AUTO: 149 K/UL
POTASSIUM SERPL-SCNC: 4.1 MMOL/L
PROT SERPL-MCNC: 7.1 G/DL
RBC # BLD: 5.09 M/UL
RBC # FLD: 13.6 %
SODIUM SERPL-SCNC: 138 MMOL/L
WBC # FLD AUTO: 6.34 K/UL

## 2023-03-09 ENCOUNTER — OFFICE (OUTPATIENT)
Dept: URBAN - METROPOLITAN AREA CLINIC 109 | Facility: CLINIC | Age: 58
Setting detail: OPHTHALMOLOGY
End: 2023-03-09
Payer: COMMERCIAL

## 2023-03-09 DIAGNOSIS — Z96.1: ICD-10-CM

## 2023-03-09 PROCEDURE — 99024 POSTOP FOLLOW-UP VISIT: CPT | Performed by: OPHTHALMOLOGY

## 2023-03-09 ASSESSMENT — SPHEQUIV_DERIVED
OS_SPHEQUIV: -0.25
OD_SPHEQUIV: -0.125
OS_SPHEQUIV: 0.25
OD_SPHEQUIV: 0.5

## 2023-03-09 ASSESSMENT — CONFRONTATIONAL VISUAL FIELD TEST (CVF)
OS_FINDINGS: FULL
OD_FINDINGS: FULL

## 2023-03-09 ASSESSMENT — REFRACTION_MANIFEST
OS_AXIS: 94
OS_SPHERE: +0.50
OD_SPHERE: +0.25
OD_CYLINDER: -0.75
OS_CYLINDER: -0.50
OD_AXIS: 66

## 2023-03-09 ASSESSMENT — KERATOMETRY
OS_AXISANGLE_DEGREES: 13
OD_AXISANGLE_DEGREES: 153
OD_K1POWER_DIOPTERS: 43.00
OD_K2POWER_DIOPTERS: 43.62
OS_K2POWER_DIOPTERS: 43.25
OS_K1POWER_DIOPTERS: 42.62

## 2023-03-09 ASSESSMENT — AXIALLENGTH_DERIVED
OS_AL: 23.9008
OS_AL: 23.7025
OD_AL: 23.711
OD_AL: 23.4675

## 2023-03-09 ASSESSMENT — REFRACTION_CURRENTRX
OD_OVR_VA: 20/
OD_SPHERE: -0.50
OD_AXIS: 98
OS_OVR_VA: 20/
OS_CYLINDER: -0.50
OS_SPHERE: -0.25
OS_AXIS: 88
OD_CYLINDER: -0.50

## 2023-03-09 ASSESSMENT — REFRACTION_AUTOREFRACTION
OD_CYLINDER: -0.50
OD_SPHERE: +0.75
OS_AXIS: 60
OS_CYLINDER: -0.50
OD_AXIS: 69
OS_SPHERE: 0.00

## 2023-03-09 ASSESSMENT — VISUAL ACUITY
OD_BCVA: 20/20
OS_BCVA: 20/20

## 2023-03-09 ASSESSMENT — TONOMETRY
OD_IOP_MMHG: 17
OS_IOP_MMHG: 17

## 2023-04-07 ENCOUNTER — APPOINTMENT (OUTPATIENT)
Dept: CARDIOLOGY | Facility: CLINIC | Age: 58
End: 2023-04-07

## 2023-06-23 ENCOUNTER — OFFICE (OUTPATIENT)
Dept: URBAN - METROPOLITAN AREA CLINIC 109 | Facility: CLINIC | Age: 58
Setting detail: OPHTHALMOLOGY
End: 2023-06-23
Payer: COMMERCIAL

## 2023-06-23 DIAGNOSIS — H40.013: ICD-10-CM

## 2023-06-23 PROCEDURE — 92133 CPTRZD OPH DX IMG PST SGM ON: CPT | Performed by: OPHTHALMOLOGY

## 2023-06-23 PROCEDURE — 92083 EXTENDED VISUAL FIELD XM: CPT | Performed by: OPHTHALMOLOGY

## 2023-06-23 PROCEDURE — 92012 INTRM OPH EXAM EST PATIENT: CPT | Performed by: OPHTHALMOLOGY

## 2023-06-23 PROCEDURE — 92020 GONIOSCOPY: CPT | Performed by: OPHTHALMOLOGY

## 2023-06-23 ASSESSMENT — REFRACTION_MANIFEST
OS_AXIS: 94
OD_CYLINDER: -0.75
OS_CYLINDER: -0.50
OD_AXIS: 66
OD_SPHERE: +0.25
OS_SPHERE: +0.50

## 2023-06-23 ASSESSMENT — SPHEQUIV_DERIVED
OS_SPHEQUIV: 0.25
OD_SPHEQUIV: -0.125
OD_SPHEQUIV: 0.25
OS_SPHEQUIV: -0.125

## 2023-06-23 ASSESSMENT — REFRACTION_AUTOREFRACTION
OD_SPHERE: +0.75
OS_CYLINDER: -0.75
OD_AXIS: 073
OD_CYLINDER: -1.00
OS_SPHERE: +0.25
OS_AXIS: 098

## 2023-06-23 ASSESSMENT — REFRACTION_CURRENTRX
OS_CYLINDER: -0.50
OD_CYLINDER: -0.50
OD_OVR_VA: 20/
OS_AXIS: 88
OS_OVR_VA: 20/
OD_AXIS: 98
OS_SPHERE: -0.25
OD_SPHERE: -0.50

## 2023-06-23 ASSESSMENT — AXIALLENGTH_DERIVED
OS_AL: 23.8509
OS_AL: 23.7025
OD_AL: 23.5643
OD_AL: 23.711

## 2023-06-23 ASSESSMENT — CONFRONTATIONAL VISUAL FIELD TEST (CVF)
OS_FINDINGS: FULL
OD_FINDINGS: FULL

## 2023-06-23 ASSESSMENT — TONOMETRY
OD_IOP_MMHG: 17
OS_IOP_MMHG: 17

## 2023-06-23 ASSESSMENT — VISUAL ACUITY
OD_BCVA: 20/20-1
OS_BCVA: 20/25

## 2023-07-07 ENCOUNTER — APPOINTMENT (OUTPATIENT)
Dept: FAMILY MEDICINE | Facility: CLINIC | Age: 58
End: 2023-07-07
Payer: COMMERCIAL

## 2023-07-07 VITALS
BODY MASS INDEX: 34.8 KG/M2 | OXYGEN SATURATION: 98 % | HEIGHT: 69 IN | RESPIRATION RATE: 16 BRPM | SYSTOLIC BLOOD PRESSURE: 117 MMHG | WEIGHT: 235 LBS | DIASTOLIC BLOOD PRESSURE: 79 MMHG | HEART RATE: 70 BPM | TEMPERATURE: 98.3 F

## 2023-07-07 PROCEDURE — 36415 COLL VENOUS BLD VENIPUNCTURE: CPT

## 2023-07-07 PROCEDURE — 99214 OFFICE O/P EST MOD 30 MIN: CPT | Mod: 25

## 2023-07-07 NOTE — ASSESSMENT
[FreeTextEntry1] : HTN:\par BP improved, to avoid episodes of hypotension, will discontinue hctz\par -will continue current medication regimen of carvedilol ER 20mg daily and irbesartan 150mg daily \par -Patient advised to continue following a healthy meal plan with less salt and more fruits and vegetables, in addition to increasing physical activity.\par \par Prediabetes\par will check hgb a1c today\par \par PVCs\par chronic, stable\par follow up with cardio as needed\par \par Thrombocytopenia\par improving, will check cbc today\par likely ITP\par \par Elevated Transaminases\par may be secondary to nonalcoholic fatty liver disease in setting of other risk factors including increased BMI, hyperlipidemia and type 2 diabetes\par will order repeat CMP\par If repeat cmp with persistently elevated AST and ALT, will send for ultrasound of liver

## 2023-07-07 NOTE — HISTORY OF PRESENT ILLNESS
[FreeTextEntry1] : here for 6 months check up [de-identified] : 59 y/o male with pmhx of HTN, PVCs presents for follow up.  Patient states he has been doing well with his diet and has been losing weight.  He has even noticed that his blood pressure has been improving.  States that he has recently started noticing lower blood pressures at home at times his blood pressure is even with a systolic less than 100.  Denies any episodes of lightheadedness or dizziness.

## 2023-07-10 ENCOUNTER — NON-APPOINTMENT (OUTPATIENT)
Age: 58
End: 2023-07-10

## 2023-07-10 LAB
ALBUMIN SERPL ELPH-MCNC: 4.6 G/DL
ALP BLD-CCNC: 101 U/L
ALT SERPL-CCNC: 41 U/L
ANION GAP SERPL CALC-SCNC: 16 MMOL/L
AST SERPL-CCNC: 38 U/L
BILIRUB SERPL-MCNC: 1 MG/DL
BUN SERPL-MCNC: 16 MG/DL
CALCIUM SERPL-MCNC: 9.6 MG/DL
CHLORIDE SERPL-SCNC: 102 MMOL/L
CHOLEST SERPL-MCNC: 167 MG/DL
CO2 SERPL-SCNC: 22 MMOL/L
CREAT SERPL-MCNC: 1.16 MG/DL
EGFR: 73 ML/MIN/1.73M2
ESTIMATED AVERAGE GLUCOSE: 120 MG/DL
GLUCOSE SERPL-MCNC: 110 MG/DL
HBA1C MFR BLD HPLC: 5.8 %
HDLC SERPL-MCNC: 38 MG/DL
LDLC SERPL CALC-MCNC: 110 MG/DL
NONHDLC SERPL-MCNC: 129 MG/DL
POTASSIUM SERPL-SCNC: 4.4 MMOL/L
PROT SERPL-MCNC: 6.9 G/DL
SODIUM SERPL-SCNC: 140 MMOL/L
TRIGL SERPL-MCNC: 93 MG/DL

## 2023-08-16 ENCOUNTER — RX RENEWAL (OUTPATIENT)
Age: 58
End: 2023-08-16

## 2023-10-03 ENCOUNTER — RX RENEWAL (OUTPATIENT)
Age: 58
End: 2023-10-03

## 2023-10-06 ENCOUNTER — APPOINTMENT (OUTPATIENT)
Dept: FAMILY MEDICINE | Facility: CLINIC | Age: 58
End: 2023-10-06

## 2023-12-08 ENCOUNTER — EMERGENCY (EMERGENCY)
Facility: HOSPITAL | Age: 58
LOS: 1 days | Discharge: HOME | End: 2023-12-08
Attending: EMERGENCY MEDICINE
Payer: MEDICARE

## 2023-12-08 VITALS
TEMPERATURE: 98 F | HEIGHT: 69 IN | WEIGHT: 250.89 LBS | HEART RATE: 67 BPM | OXYGEN SATURATION: 99 % | DIASTOLIC BLOOD PRESSURE: 85 MMHG | SYSTOLIC BLOOD PRESSURE: 146 MMHG | RESPIRATION RATE: 16 BRPM

## 2023-12-08 VITALS
RESPIRATION RATE: 17 BRPM | SYSTOLIC BLOOD PRESSURE: 132 MMHG | OXYGEN SATURATION: 98 % | HEART RATE: 67 BPM | DIASTOLIC BLOOD PRESSURE: 74 MMHG

## 2023-12-08 LAB
ALBUMIN SERPL ELPH-MCNC: 4.4 G/DL — SIGNIFICANT CHANGE UP (ref 3.3–5.2)
ALBUMIN SERPL ELPH-MCNC: 4.4 G/DL — SIGNIFICANT CHANGE UP (ref 3.3–5.2)
ALP SERPL-CCNC: 103 U/L — SIGNIFICANT CHANGE UP (ref 40–120)
ALP SERPL-CCNC: 103 U/L — SIGNIFICANT CHANGE UP (ref 40–120)
ALT FLD-CCNC: 32 U/L — SIGNIFICANT CHANGE UP
ALT FLD-CCNC: 32 U/L — SIGNIFICANT CHANGE UP
ANION GAP SERPL CALC-SCNC: 12 MMOL/L — SIGNIFICANT CHANGE UP (ref 5–17)
ANION GAP SERPL CALC-SCNC: 12 MMOL/L — SIGNIFICANT CHANGE UP (ref 5–17)
APTT BLD: 53.6 SEC — HIGH (ref 24.5–35.6)
APTT BLD: 53.6 SEC — HIGH (ref 24.5–35.6)
AST SERPL-CCNC: 32 U/L — SIGNIFICANT CHANGE UP
AST SERPL-CCNC: 32 U/L — SIGNIFICANT CHANGE UP
BASOPHILS # BLD AUTO: 0.03 K/UL — SIGNIFICANT CHANGE UP (ref 0–0.2)
BASOPHILS # BLD AUTO: 0.03 K/UL — SIGNIFICANT CHANGE UP (ref 0–0.2)
BASOPHILS NFR BLD AUTO: 0.4 % — SIGNIFICANT CHANGE UP (ref 0–2)
BASOPHILS NFR BLD AUTO: 0.4 % — SIGNIFICANT CHANGE UP (ref 0–2)
BILIRUB SERPL-MCNC: 0.7 MG/DL — SIGNIFICANT CHANGE UP (ref 0.4–2)
BILIRUB SERPL-MCNC: 0.7 MG/DL — SIGNIFICANT CHANGE UP (ref 0.4–2)
BUN SERPL-MCNC: 17.4 MG/DL — SIGNIFICANT CHANGE UP (ref 8–20)
BUN SERPL-MCNC: 17.4 MG/DL — SIGNIFICANT CHANGE UP (ref 8–20)
CALCIUM SERPL-MCNC: 9.1 MG/DL — SIGNIFICANT CHANGE UP (ref 8.4–10.5)
CALCIUM SERPL-MCNC: 9.1 MG/DL — SIGNIFICANT CHANGE UP (ref 8.4–10.5)
CHLORIDE SERPL-SCNC: 101 MMOL/L — SIGNIFICANT CHANGE UP (ref 96–108)
CHLORIDE SERPL-SCNC: 101 MMOL/L — SIGNIFICANT CHANGE UP (ref 96–108)
CO2 SERPL-SCNC: 25 MMOL/L — SIGNIFICANT CHANGE UP (ref 22–29)
CO2 SERPL-SCNC: 25 MMOL/L — SIGNIFICANT CHANGE UP (ref 22–29)
CREAT SERPL-MCNC: 1.09 MG/DL — SIGNIFICANT CHANGE UP (ref 0.5–1.3)
CREAT SERPL-MCNC: 1.09 MG/DL — SIGNIFICANT CHANGE UP (ref 0.5–1.3)
EGFR: 79 ML/MIN/1.73M2 — SIGNIFICANT CHANGE UP
EGFR: 79 ML/MIN/1.73M2 — SIGNIFICANT CHANGE UP
EOSINOPHIL # BLD AUTO: 0.09 K/UL — SIGNIFICANT CHANGE UP (ref 0–0.5)
EOSINOPHIL # BLD AUTO: 0.09 K/UL — SIGNIFICANT CHANGE UP (ref 0–0.5)
EOSINOPHIL NFR BLD AUTO: 1.2 % — SIGNIFICANT CHANGE UP (ref 0–6)
EOSINOPHIL NFR BLD AUTO: 1.2 % — SIGNIFICANT CHANGE UP (ref 0–6)
GLUCOSE SERPL-MCNC: 108 MG/DL — HIGH (ref 70–99)
GLUCOSE SERPL-MCNC: 108 MG/DL — HIGH (ref 70–99)
HCT VFR BLD CALC: 43.4 % — SIGNIFICANT CHANGE UP (ref 39–50)
HCT VFR BLD CALC: 43.4 % — SIGNIFICANT CHANGE UP (ref 39–50)
HGB BLD-MCNC: 15.3 G/DL — SIGNIFICANT CHANGE UP (ref 13–17)
HGB BLD-MCNC: 15.3 G/DL — SIGNIFICANT CHANGE UP (ref 13–17)
IMM GRANULOCYTES NFR BLD AUTO: 0.7 % — SIGNIFICANT CHANGE UP (ref 0–0.9)
IMM GRANULOCYTES NFR BLD AUTO: 0.7 % — SIGNIFICANT CHANGE UP (ref 0–0.9)
INR BLD: 1.05 RATIO — SIGNIFICANT CHANGE UP (ref 0.85–1.18)
INR BLD: 1.05 RATIO — SIGNIFICANT CHANGE UP (ref 0.85–1.18)
LYMPHOCYTES # BLD AUTO: 1.15 K/UL — SIGNIFICANT CHANGE UP (ref 1–3.3)
LYMPHOCYTES # BLD AUTO: 1.15 K/UL — SIGNIFICANT CHANGE UP (ref 1–3.3)
LYMPHOCYTES # BLD AUTO: 15.3 % — SIGNIFICANT CHANGE UP (ref 13–44)
LYMPHOCYTES # BLD AUTO: 15.3 % — SIGNIFICANT CHANGE UP (ref 13–44)
MCHC RBC-ENTMCNC: 29.9 PG — SIGNIFICANT CHANGE UP (ref 27–34)
MCHC RBC-ENTMCNC: 29.9 PG — SIGNIFICANT CHANGE UP (ref 27–34)
MCHC RBC-ENTMCNC: 35.3 GM/DL — SIGNIFICANT CHANGE UP (ref 32–36)
MCHC RBC-ENTMCNC: 35.3 GM/DL — SIGNIFICANT CHANGE UP (ref 32–36)
MCV RBC AUTO: 84.8 FL — SIGNIFICANT CHANGE UP (ref 80–100)
MCV RBC AUTO: 84.8 FL — SIGNIFICANT CHANGE UP (ref 80–100)
MONOCYTES # BLD AUTO: 0.77 K/UL — SIGNIFICANT CHANGE UP (ref 0–0.9)
MONOCYTES # BLD AUTO: 0.77 K/UL — SIGNIFICANT CHANGE UP (ref 0–0.9)
MONOCYTES NFR BLD AUTO: 10.2 % — SIGNIFICANT CHANGE UP (ref 2–14)
MONOCYTES NFR BLD AUTO: 10.2 % — SIGNIFICANT CHANGE UP (ref 2–14)
NEUTROPHILS # BLD AUTO: 5.44 K/UL — SIGNIFICANT CHANGE UP (ref 1.8–7.4)
NEUTROPHILS # BLD AUTO: 5.44 K/UL — SIGNIFICANT CHANGE UP (ref 1.8–7.4)
NEUTROPHILS NFR BLD AUTO: 72.2 % — SIGNIFICANT CHANGE UP (ref 43–77)
NEUTROPHILS NFR BLD AUTO: 72.2 % — SIGNIFICANT CHANGE UP (ref 43–77)
PLATELET # BLD AUTO: 152 K/UL — SIGNIFICANT CHANGE UP (ref 150–400)
PLATELET # BLD AUTO: 152 K/UL — SIGNIFICANT CHANGE UP (ref 150–400)
POTASSIUM SERPL-MCNC: 4.4 MMOL/L — SIGNIFICANT CHANGE UP (ref 3.5–5.3)
POTASSIUM SERPL-MCNC: 4.4 MMOL/L — SIGNIFICANT CHANGE UP (ref 3.5–5.3)
POTASSIUM SERPL-SCNC: 4.4 MMOL/L — SIGNIFICANT CHANGE UP (ref 3.5–5.3)
POTASSIUM SERPL-SCNC: 4.4 MMOL/L — SIGNIFICANT CHANGE UP (ref 3.5–5.3)
PROT SERPL-MCNC: 6.9 G/DL — SIGNIFICANT CHANGE UP (ref 6.6–8.7)
PROT SERPL-MCNC: 6.9 G/DL — SIGNIFICANT CHANGE UP (ref 6.6–8.7)
PROTHROM AB SERPL-ACNC: 11.6 SEC — SIGNIFICANT CHANGE UP (ref 9.5–13)
PROTHROM AB SERPL-ACNC: 11.6 SEC — SIGNIFICANT CHANGE UP (ref 9.5–13)
RBC # BLD: 5.12 M/UL — SIGNIFICANT CHANGE UP (ref 4.2–5.8)
RBC # BLD: 5.12 M/UL — SIGNIFICANT CHANGE UP (ref 4.2–5.8)
RBC # FLD: 13.2 % — SIGNIFICANT CHANGE UP (ref 10.3–14.5)
RBC # FLD: 13.2 % — SIGNIFICANT CHANGE UP (ref 10.3–14.5)
SODIUM SERPL-SCNC: 138 MMOL/L — SIGNIFICANT CHANGE UP (ref 135–145)
SODIUM SERPL-SCNC: 138 MMOL/L — SIGNIFICANT CHANGE UP (ref 135–145)
TROPONIN T, HIGH SENSITIVITY RESULT: 30 NG/L — SIGNIFICANT CHANGE UP (ref 0–51)
TROPONIN T, HIGH SENSITIVITY RESULT: 30 NG/L — SIGNIFICANT CHANGE UP (ref 0–51)
TROPONIN T, HIGH SENSITIVITY RESULT: 32 NG/L — SIGNIFICANT CHANGE UP (ref 0–51)
TROPONIN T, HIGH SENSITIVITY RESULT: 32 NG/L — SIGNIFICANT CHANGE UP (ref 0–51)
WBC # BLD: 7.53 K/UL — SIGNIFICANT CHANGE UP (ref 3.8–10.5)
WBC # BLD: 7.53 K/UL — SIGNIFICANT CHANGE UP (ref 3.8–10.5)
WBC # FLD AUTO: 7.53 K/UL — SIGNIFICANT CHANGE UP (ref 3.8–10.5)
WBC # FLD AUTO: 7.53 K/UL — SIGNIFICANT CHANGE UP (ref 3.8–10.5)

## 2023-12-08 PROCEDURE — 93005 ELECTROCARDIOGRAM TRACING: CPT

## 2023-12-08 PROCEDURE — 84484 ASSAY OF TROPONIN QUANT: CPT

## 2023-12-08 PROCEDURE — 36415 COLL VENOUS BLD VENIPUNCTURE: CPT

## 2023-12-08 PROCEDURE — 85610 PROTHROMBIN TIME: CPT

## 2023-12-08 PROCEDURE — 0042T: CPT | Mod: MA

## 2023-12-08 PROCEDURE — 93010 ELECTROCARDIOGRAM REPORT: CPT

## 2023-12-08 PROCEDURE — 70496 CT ANGIOGRAPHY HEAD: CPT | Mod: 26,MA

## 2023-12-08 PROCEDURE — 85025 COMPLETE CBC W/AUTO DIFF WBC: CPT

## 2023-12-08 PROCEDURE — 80053 COMPREHEN METABOLIC PANEL: CPT

## 2023-12-08 PROCEDURE — 99291 CRITICAL CARE FIRST HOUR: CPT

## 2023-12-08 PROCEDURE — 70450 CT HEAD/BRAIN W/O DYE: CPT | Mod: 26,MA,59

## 2023-12-08 PROCEDURE — 82962 GLUCOSE BLOOD TEST: CPT

## 2023-12-08 PROCEDURE — 70498 CT ANGIOGRAPHY NECK: CPT | Mod: 26,MA

## 2023-12-08 PROCEDURE — 70498 CT ANGIOGRAPHY NECK: CPT | Mod: MA

## 2023-12-08 PROCEDURE — 85730 THROMBOPLASTIN TIME PARTIAL: CPT

## 2023-12-08 PROCEDURE — 70450 CT HEAD/BRAIN W/O DYE: CPT | Mod: MA

## 2023-12-08 PROCEDURE — 70496 CT ANGIOGRAPHY HEAD: CPT | Mod: MA

## 2023-12-08 NOTE — ED ADULT NURSE NOTE - NSFALLUNIVINTERV_ED_ALL_ED
Bed/Stretcher in lowest position, wheels locked, appropriate side rails in place/Call bell, personal items and telephone in reach/Instruct patient to call for assistance before getting out of bed/chair/stretcher/Non-slip footwear applied when patient is off stretcher/[place] to call system/Physically safe environment - no spills, clutter or unnecessary equipment/Purposeful proactive rounding/Room/bathroom lighting operational, light cord in reach Bed/Stretcher in lowest position, wheels locked, appropriate side rails in place/Call bell, personal items and telephone in reach/Instruct patient to call for assistance before getting out of bed/chair/stretcher/Non-slip footwear applied when patient is off stretcher/[place] to call system/Physically safe environment - no spills, clutter or unnecessary equipment/Purposeful proactive rounding/Room/bathroom lighting operational, light cord in reach

## 2023-12-08 NOTE — ED PROVIDER NOTE - CARE PROVIDERS DIRECT ADDRESSES
[email or web address]

## 2023-12-08 NOTE — ED PROVIDER NOTE - CLINICAL SUMMARY MEDICAL DECISION MAKING FREE TEXT BOX
57 y/o M with PMHx cervical herniated discs, TIA, palpitations, who presents to the ED got L hand weakness and L oral numbness starting around 0800 this morning which he states was the last time he felt completely normal. Will obtain code stroke labs and imaging, and reassess.

## 2023-12-08 NOTE — ED PROVIDER NOTE - CARE PROVIDER_API CALL
Jayden Layne  Neurology  [address] [place] [address], [facility] [address]  [place] [address]  Phone: [phone]  Fax: [phone]  Follow Up Time:    Jayden Layne  Neurology  [address] [place] [address], [facility] 1  [place] [address]  Phone: [phone]  Fax: [phone]  Follow Up Time:

## 2023-12-08 NOTE — ED PROVIDER NOTE - PHYSICAL EXAMINATION
General: well appearing, NAD  Head: NC, AT  EENT: EOMI, no scleral icterus  Cardiac: RRR, no apparent murmurs, no lower extremity edema  Respiratory: CTABL, no respiratory distress   Abdomen: soft, ND, NT, nonperitonitic  MSK/Vascular: soft compartments, warm extremities  Neuro: cranial nerves intact, strength and sensation intact  Psych: calm, cooperative

## 2023-12-08 NOTE — ED ADULT TRIAGE NOTE - CHIEF COMPLAINT QUOTE
Pt went to get a bottle of water out of the fridge around 0830 and was having a hard time grabbing the bottle. He had no control over his left hand. It feels slightly better now but still feels "heavy". Pt also reports numbness to left side of mouth that has not resolved.

## 2023-12-08 NOTE — ED ADULT NURSE NOTE - OBJECTIVE STATEMENT
Pt is a 58YOM who is here for numbness and tingling in his left hand and left crook of his mouth, pt denies any dizziness, nausea, vomiting, chest pain, shortness of breath, difficulty breathing, pt states that he woke up this morning and everything was fine, pt states around [id number], he went to grab a water bottle out of the refrigerator and he could not move his left hand, pt drove himself to the Emergency Department and walked in without assistance, pt denies any neurological deficits and states he has a hx of multiple spinal issues including a spinal fusion. Pt is A&O4. Pt is a 58YOM who is here for numbness and tingling in his left hand and left crook of his mouth, pt denies any dizziness, nausea, vomiting, chest pain, shortness of breath, difficulty breathing, pt states that he woke up this morning and everything was fine, pt states around 2237-5706, he went to grab a water bottle out of the refrigerator and he could not move his left hand, pt drove himself to the Emergency Department and walked in without assistance, pt denies any neurological deficits and states he has a hx of multiple spinal issues including a spinal fusion. Pt is A&O4.

## 2023-12-08 NOTE — ED PROVIDER NOTE - NS ED ROS FT
Constitutional: no fever, no chills  Head: NC, AT  Eyes: no redness  ENMT: no nasal congestion/drainage  CV: no chest pain, no edema  Resp: no cough, no dyspnea  GI: no abdominal pain, no nausea, no vomiting, no diarrhea  : no dysuria, no hematuria   Skin: no lesions, no rashes   Neuro: no LOC, no headache

## 2023-12-08 NOTE — ED PROVIDER NOTE - PATIENT PORTAL LINK FT
You can access the FollowMyHealth Patient Portal offered by [facility] by registering at the following website: [email or web address]. By joining [employer]’s FollowMyHealth portal, you will also be able to view your health information using other applications (apps) compatible with our system. You can access the FollowMyHealth Patient Portal offered by [facility] by registering at the following website: [email or web address]. By joining [employer]’s FollowMyHealth portal, you will also be able to view your health information using other applications (apps) compatible with our system.

## 2023-12-08 NOTE — ED PROVIDER NOTE - PROGRESS NOTE DETAILS
CT showing bilateral frontal lobe areas of increased Tmax may suggest brain at risk versus artifact. No large vessel occlusion. Patient remains neuro intact. Imaging results do not correlate with patient's symptoms. Patient comfortable and in no acute distress. Stable for d/c with Neuro f/u and return precautions. Results explained to patient, shared decision making implemented. - Birdie

## 2023-12-08 NOTE — ED ADULT NURSE REASSESSMENT NOTE - NSFALLUNIVINTERV_ED_ALL_ED
Bed/Stretcher in lowest position, wheels locked, appropriate side rails in place/Call bell, personal items and telephone in reach/Instruct patient to call for assistance before getting out of bed/chair/stretcher/Non-slip footwear applied when patient is off stretcher/[place] to call system/Physically safe environment - no spills, clutter or unnecessary equipment/Purposeful proactive rounding/Room/bathroom lighting operational, light cord in reach Bed/Stretcher in lowest position, wheels locked, appropriate side rails in place/Call bell, personal items and telephone in reach/Instruct patient to call for assistance before getting out of bed/chair/stretcher/Non-slip footwear applied when patient is off stretcher/[place] to call system/Physically safe environment - no spills, clutter or unnecessary equipment/Purposeful proactive rounding/Room/bathroom lighting operational, light cord in reach

## 2023-12-08 NOTE — ED PROVIDER NOTE - OBJECTIVE STATEMENT
57 y/o M with PMHx cervical herniated discs, TIA, palpitations, who presents to the ED got L hand weakness and L oral numbness starting around 0800 this morning which he states was the last time he felt completely normal. States that he went to his fridge and had a difficult time grabbing a water bottle and also felt the L sided mouth numbness. States that his symptoms have greatly improved since coming here and on evaluation patient is neuro intact. Otherwise denies any recent illnesses or any other complaints at this time.

## 2024-01-17 ENCOUNTER — APPOINTMENT (OUTPATIENT)
Dept: FAMILY MEDICINE | Facility: CLINIC | Age: 59
End: 2024-01-17
Payer: MEDICARE

## 2024-01-17 VITALS
BODY MASS INDEX: 36.65 KG/M2 | DIASTOLIC BLOOD PRESSURE: 80 MMHG | TEMPERATURE: 98 F | WEIGHT: 248.2 LBS | SYSTOLIC BLOOD PRESSURE: 138 MMHG | OXYGEN SATURATION: 99 % | HEART RATE: 85 BPM | RESPIRATION RATE: 16 BRPM

## 2024-01-17 DIAGNOSIS — E55.9 VITAMIN D DEFICIENCY, UNSPECIFIED: ICD-10-CM

## 2024-01-17 DIAGNOSIS — R73.03 PREDIABETES.: ICD-10-CM

## 2024-01-17 DIAGNOSIS — E78.5 HYPERLIPIDEMIA, UNSPECIFIED: ICD-10-CM

## 2024-01-17 DIAGNOSIS — R74.01 ELEVATION OF LEVELS OF LIVER TRANSAMINASE LEVELS: ICD-10-CM

## 2024-01-17 DIAGNOSIS — D69.6 THROMBOCYTOPENIA, UNSPECIFIED: ICD-10-CM

## 2024-01-17 DIAGNOSIS — I10 ESSENTIAL (PRIMARY) HYPERTENSION: ICD-10-CM

## 2024-01-17 PROCEDURE — 99214 OFFICE O/P EST MOD 30 MIN: CPT | Mod: 25

## 2024-01-17 PROCEDURE — 36415 COLL VENOUS BLD VENIPUNCTURE: CPT

## 2024-01-17 RX ORDER — CARVEDILOL PHOSPHATE 20 MG/1
20 CAPSULE, EXTENDED RELEASE ORAL DAILY
Qty: 90 | Refills: 1 | Status: ACTIVE | COMMUNITY
Start: 1900-01-01 | End: 1900-01-01

## 2024-01-17 RX ORDER — IRBESARTAN AND HYDROCHLOROTHIAZIDE 150; 12.5 MG/1; MG/1
150-12.5 TABLET ORAL DAILY
Qty: 90 | Refills: 1 | Status: ACTIVE | COMMUNITY
Start: 1900-01-01 | End: 1900-01-01

## 2024-01-17 RX ORDER — IRBESARTAN 150 MG/1
150 TABLET ORAL DAILY
Qty: 90 | Refills: 0 | Status: DISCONTINUED | COMMUNITY
Start: 2023-07-07 | End: 2024-01-17

## 2024-01-17 NOTE — HISTORY OF PRESENT ILLNESS
[FreeTextEntry1] : Pt is here for three months f/u  on BP. [de-identified] : 57 y/o male with pmhx of HTN, PVCs, prediabetes, HLD, presents for follow up. Patient states that he noticed his blood pressure was running high so he switched back to irbesartan-hydrochlorothiazide. States he has also gained about 15 pounds while off the hctz.

## 2024-01-17 NOTE — HEALTH RISK ASSESSMENT
[0] : 2) Feeling down, depressed, or hopeless: Not at all (0) [PHQ-2 Negative - No further assessment needed] : PHQ-2 Negative - No further assessment needed [[id number]] : 0 [Never] : Never

## 2024-01-17 NOTE — PHYSICAL EXAM
[No Edema] : there was no peripheral edema [Coordination Grossly Intact] : coordination grossly intact [No Focal Deficits] : no focal deficits [Normal Gait] : normal gait [Normal] : affect was normal and insight and judgment were intact [de-identified] : dry skin in b/l ear canals

## 2024-01-17 NOTE — ASSESSMENT
[FreeTextEntry1] : HTN: BP somewhat elevated at todays visit patient recently switched back to irbesartan-hctz 150-12.5mg daily, will continue continue carvedilol er 20mg daily -Patient advised to continue following a healthy meal plan with less salt and more fruits and vegetables, in addition to increasing physical activity.  Prediabetes will check hgb a1c today  PVCs chronic, stable follow up with cardio as needed  Thrombocytopenia improving, will check cbc today likely ITP  HLD will repeat lipid panel today  Vitamin D deficiency will check vitamin d levels

## 2024-01-19 DIAGNOSIS — R79.89 OTHER SPECIFIED ABNORMAL FINDINGS OF BLOOD CHEMISTRY: ICD-10-CM

## 2024-01-19 LAB
25(OH)D3 SERPL-MCNC: 25.3 NG/ML
ALBUMIN SERPL ELPH-MCNC: 4.4 G/DL
ALP BLD-CCNC: 107 U/L
ALT SERPL-CCNC: 28 U/L
ANION GAP SERPL CALC-SCNC: 12 MMOL/L
AST SERPL-CCNC: 26 U/L
BASOPHILS # BLD AUTO: 0.03 K/UL
BASOPHILS NFR BLD AUTO: 0.5 %
BILIRUB SERPL-MCNC: 0.5 MG/DL
BUN SERPL-MCNC: 24 MG/DL
CALCIUM SERPL-MCNC: 9.2 MG/DL
CHLORIDE SERPL-SCNC: 102 MMOL/L
CHOLEST SERPL-MCNC: 188 MG/DL
CO2 SERPL-SCNC: 24 MMOL/L
CREAT SERPL-MCNC: 1.45 MG/DL
EGFR: 56 ML/MIN/1.73M2
EOSINOPHIL # BLD AUTO: 0.1 K/UL
EOSINOPHIL NFR BLD AUTO: 1.6 %
ESTIMATED AVERAGE GLUCOSE: 126 MG/DL
GLUCOSE SERPL-MCNC: 107 MG/DL
HBA1C MFR BLD HPLC: 6 %
HCT VFR BLD CALC: 43.3 %
HDLC SERPL-MCNC: 39 MG/DL
HGB BLD-MCNC: 14.4 G/DL
IMM GRANULOCYTES NFR BLD AUTO: 0.8 %
LDLC SERPL CALC-MCNC: 127 MG/DL
LYMPHOCYTES # BLD AUTO: 1.28 K/UL
LYMPHOCYTES NFR BLD AUTO: 20 %
MAN DIFF?: NORMAL
MCHC RBC-ENTMCNC: 29.3 PG
MCHC RBC-ENTMCNC: 33.3 GM/DL
MCV RBC AUTO: 88 FL
MONOCYTES # BLD AUTO: 0.74 K/UL
MONOCYTES NFR BLD AUTO: 11.6 %
NEUTROPHILS # BLD AUTO: 4.19 K/UL
NEUTROPHILS NFR BLD AUTO: 65.5 %
NONHDLC SERPL-MCNC: 149 MG/DL
PLATELET # BLD AUTO: 144 K/UL
POTASSIUM SERPL-SCNC: 4 MMOL/L
PROT SERPL-MCNC: 6.6 G/DL
RBC # BLD: 4.92 M/UL
RBC # FLD: 13.8 %
SODIUM SERPL-SCNC: 138 MMOL/L
TRIGL SERPL-MCNC: 124 MG/DL
TSH SERPL-ACNC: 2.32 UIU/ML
WBC # FLD AUTO: 6.39 K/UL

## 2024-01-26 LAB
ANION GAP SERPL CALC-SCNC: 12 MMOL/L
BUN SERPL-MCNC: 24 MG/DL
CALCIUM SERPL-MCNC: 9 MG/DL
CHLORIDE SERPL-SCNC: 102 MMOL/L
CO2 SERPL-SCNC: 26 MMOL/L
CREAT SERPL-MCNC: 1.26 MG/DL
EGFR: 66 ML/MIN/1.73M2
GLUCOSE SERPL-MCNC: 111 MG/DL
POTASSIUM SERPL-SCNC: 3.9 MMOL/L
SODIUM SERPL-SCNC: 140 MMOL/L

## 2024-02-12 ENCOUNTER — OFFICE (OUTPATIENT)
Dept: URBAN - METROPOLITAN AREA CLINIC 109 | Facility: CLINIC | Age: 59
Setting detail: OPHTHALMOLOGY
End: 2024-02-12
Payer: COMMERCIAL

## 2024-02-12 DIAGNOSIS — H40.013: ICD-10-CM

## 2024-02-12 PROCEDURE — 92250 FUNDUS PHOTOGRAPHY W/I&R: CPT | Performed by: OPHTHALMOLOGY

## 2024-02-12 PROCEDURE — 92083 EXTENDED VISUAL FIELD XM: CPT | Performed by: OPHTHALMOLOGY

## 2024-02-12 PROCEDURE — 92014 COMPRE OPH EXAM EST PT 1/>: CPT | Performed by: OPHTHALMOLOGY

## 2024-02-12 ASSESSMENT — SPHEQUIV_DERIVED
OD_SPHEQUIV: 0
OD_SPHEQUIV: 0.875
OS_SPHEQUIV: 0.125
OS_SPHEQUIV: 0.25

## 2024-02-12 ASSESSMENT — REFRACTION_CURRENTRX
OS_CYLINDER: -0.50
OD_SPHERE: -0.50
OD_CYLINDER: -0.50
OS_SPHERE: -0.25
OD_AXIS: 98
OD_OVR_VA: 20/
OS_AXIS: 88
OS_OVR_VA: 20/

## 2024-02-12 ASSESSMENT — REFRACTION_AUTOREFRACTION
OS_AXIS: 89
OD_AXIS: 081
OS_CYLINDER: -0.75
OD_CYLINDER: -1.25
OD_SPHERE: +1.50
OS_SPHERE: +0.50

## 2024-02-12 ASSESSMENT — REFRACTION_MANIFEST
OS_SPHERE: +0.50
OD_AXIS: 080
OD_CYLINDER: -0.50
OS_AXIS: 94
OD_VA1: 20/20
OS_CYLINDER: -0.50
OD_SPHERE: +0.25

## 2024-02-12 ASSESSMENT — CONFRONTATIONAL VISUAL FIELD TEST (CVF)
OS_FINDINGS: FULL
OD_FINDINGS: FULL

## 2024-02-26 ENCOUNTER — INPATIENT (INPATIENT)
Facility: HOSPITAL | Age: 59
LOS: 1 days | Discharge: HOME | DRG: 101 | End: 2024-02-28
Attending: INTERNAL MEDICINE | Admitting: HOSPITALIST
Payer: MEDICARE

## 2024-02-26 VITALS
TEMPERATURE: 98 F | WEIGHT: 199.96 LBS | HEART RATE: 91 BPM | DIASTOLIC BLOOD PRESSURE: 82 MMHG | OXYGEN SATURATION: 94 % | SYSTOLIC BLOOD PRESSURE: 155 MMHG | RESPIRATION RATE: 24 BRPM

## 2024-02-26 DIAGNOSIS — Z98.890 OTHER SPECIFIED POSTPROCEDURAL STATES: Chronic | ICD-10-CM

## 2024-02-26 DIAGNOSIS — R56.9 UNSPECIFIED CONVULSIONS: ICD-10-CM

## 2024-02-26 LAB
ALBUMIN SERPL ELPH-MCNC: 4.2 G/DL — SIGNIFICANT CHANGE UP (ref 3.3–5.2)
ALP SERPL-CCNC: 96 U/L — SIGNIFICANT CHANGE UP (ref 40–120)
ALT FLD-CCNC: 33 U/L — SIGNIFICANT CHANGE UP
AMPHET UR-MCNC: NEGATIVE — SIGNIFICANT CHANGE UP
ANION GAP SERPL CALC-SCNC: 15 MMOL/L — SIGNIFICANT CHANGE UP (ref 5–17)
APPEARANCE UR: CLEAR — SIGNIFICANT CHANGE UP
APTT BLD: 45.3 SEC — HIGH (ref 24.5–35.6)
AST SERPL-CCNC: 36 U/L — SIGNIFICANT CHANGE UP
BACTERIA # UR AUTO: NEGATIVE /HPF — SIGNIFICANT CHANGE UP
BARBITURATES UR SCN-MCNC: NEGATIVE — SIGNIFICANT CHANGE UP
BASE EXCESS BLDV CALC-SCNC: -0.3 MMOL/L — SIGNIFICANT CHANGE UP (ref -2–3)
BASOPHILS # BLD AUTO: 0.02 K/UL — SIGNIFICANT CHANGE UP (ref 0–0.2)
BASOPHILS NFR BLD AUTO: 0.2 % — SIGNIFICANT CHANGE UP (ref 0–2)
BENZODIAZ UR-MCNC: NEGATIVE — SIGNIFICANT CHANGE UP
BILIRUB SERPL-MCNC: 0.6 MG/DL — SIGNIFICANT CHANGE UP (ref 0.4–2)
BILIRUB UR-MCNC: NEGATIVE — SIGNIFICANT CHANGE UP
BLD GP AB SCN SERPL QL: SIGNIFICANT CHANGE UP
BUN SERPL-MCNC: 12.2 MG/DL — SIGNIFICANT CHANGE UP (ref 8–20)
CA-I SERPL-SCNC: 1.13 MMOL/L — LOW (ref 1.15–1.33)
CALCIUM SERPL-MCNC: 9.1 MG/DL — SIGNIFICANT CHANGE UP (ref 8.4–10.5)
CAST: 2 /LPF — SIGNIFICANT CHANGE UP (ref 0–4)
CHLORIDE BLDV-SCNC: 99 MMOL/L — SIGNIFICANT CHANGE UP (ref 96–108)
CHLORIDE SERPL-SCNC: 99 MMOL/L — SIGNIFICANT CHANGE UP (ref 96–108)
CO2 SERPL-SCNC: 22 MMOL/L — SIGNIFICANT CHANGE UP (ref 22–29)
COCAINE METAB.OTHER UR-MCNC: NEGATIVE — SIGNIFICANT CHANGE UP
COLOR SPEC: YELLOW — SIGNIFICANT CHANGE UP
CREAT SERPL-MCNC: 1.09 MG/DL — SIGNIFICANT CHANGE UP (ref 0.5–1.3)
DIFF PNL FLD: NEGATIVE — SIGNIFICANT CHANGE UP
EGFR: 79 ML/MIN/1.73M2 — SIGNIFICANT CHANGE UP
EOSINOPHIL # BLD AUTO: 0.04 K/UL — SIGNIFICANT CHANGE UP (ref 0–0.5)
EOSINOPHIL NFR BLD AUTO: 0.5 % — SIGNIFICANT CHANGE UP (ref 0–6)
ETHANOL SERPL-MCNC: <10 MG/DL — SIGNIFICANT CHANGE UP (ref 0–9)
GAS PNL BLDV: 132 MMOL/L — LOW (ref 136–145)
GAS PNL BLDV: SIGNIFICANT CHANGE UP
GLUCOSE BLDV-MCNC: 129 MG/DL — HIGH (ref 70–99)
GLUCOSE SERPL-MCNC: 132 MG/DL — HIGH (ref 70–99)
GLUCOSE UR QL: NEGATIVE MG/DL — SIGNIFICANT CHANGE UP
HCO3 BLDV-SCNC: 25 MMOL/L — SIGNIFICANT CHANGE UP (ref 22–29)
HCT VFR BLD CALC: 42.1 % — SIGNIFICANT CHANGE UP (ref 39–50)
HCT VFR BLDA CALC: 44 % — SIGNIFICANT CHANGE UP
HGB BLD CALC-MCNC: 14.6 G/DL — SIGNIFICANT CHANGE UP (ref 12.6–17.4)
HGB BLD-MCNC: 14.6 G/DL — SIGNIFICANT CHANGE UP (ref 13–17)
IMM GRANULOCYTES NFR BLD AUTO: 0.8 % — SIGNIFICANT CHANGE UP (ref 0–0.9)
INR BLD: 1.05 RATIO — SIGNIFICANT CHANGE UP (ref 0.85–1.18)
KETONES UR-MCNC: NEGATIVE MG/DL — SIGNIFICANT CHANGE UP
LACTATE BLDV-MCNC: 2.6 MMOL/L — HIGH (ref 0.5–2)
LEUKOCYTE ESTERASE UR-ACNC: NEGATIVE — SIGNIFICANT CHANGE UP
LYMPHOCYTES # BLD AUTO: 0.95 K/UL — LOW (ref 1–3.3)
LYMPHOCYTES # BLD AUTO: 11.2 % — LOW (ref 13–44)
MCHC RBC-ENTMCNC: 29.6 PG — SIGNIFICANT CHANGE UP (ref 27–34)
MCHC RBC-ENTMCNC: 34.7 GM/DL — SIGNIFICANT CHANGE UP (ref 32–36)
MCV RBC AUTO: 85.4 FL — SIGNIFICANT CHANGE UP (ref 80–100)
METHADONE UR-MCNC: NEGATIVE — SIGNIFICANT CHANGE UP
MONOCYTES # BLD AUTO: 0.67 K/UL — SIGNIFICANT CHANGE UP (ref 0–0.9)
MONOCYTES NFR BLD AUTO: 7.9 % — SIGNIFICANT CHANGE UP (ref 2–14)
NEUTROPHILS # BLD AUTO: 6.72 K/UL — SIGNIFICANT CHANGE UP (ref 1.8–7.4)
NEUTROPHILS NFR BLD AUTO: 79.4 % — HIGH (ref 43–77)
NITRITE UR-MCNC: NEGATIVE — SIGNIFICANT CHANGE UP
OPIATES UR-MCNC: NEGATIVE — SIGNIFICANT CHANGE UP
PCO2 BLDV: 42 MMHG — SIGNIFICANT CHANGE UP (ref 42–55)
PCP SPEC-MCNC: SIGNIFICANT CHANGE UP
PCP UR-MCNC: NEGATIVE — SIGNIFICANT CHANGE UP
PH BLDV: 7.38 — SIGNIFICANT CHANGE UP (ref 7.32–7.43)
PH UR: 6.5 — SIGNIFICANT CHANGE UP (ref 5–8)
PLATELET # BLD AUTO: 146 K/UL — LOW (ref 150–400)
PO2 BLDV: 81 MMHG — HIGH (ref 25–45)
POTASSIUM BLDV-SCNC: 3.7 MMOL/L — SIGNIFICANT CHANGE UP (ref 3.5–5.1)
POTASSIUM SERPL-MCNC: 4.1 MMOL/L — SIGNIFICANT CHANGE UP (ref 3.5–5.3)
POTASSIUM SERPL-SCNC: 4.1 MMOL/L — SIGNIFICANT CHANGE UP (ref 3.5–5.3)
PROT SERPL-MCNC: 6.6 G/DL — SIGNIFICANT CHANGE UP (ref 6.6–8.7)
PROT UR-MCNC: 30 MG/DL
PROTHROM AB SERPL-ACNC: 11.6 SEC — SIGNIFICANT CHANGE UP (ref 9.5–13)
RBC # BLD: 4.93 M/UL — SIGNIFICANT CHANGE UP (ref 4.2–5.8)
RBC # FLD: 13.7 % — SIGNIFICANT CHANGE UP (ref 10.3–14.5)
RBC CASTS # UR COMP ASSIST: 1 /HPF — SIGNIFICANT CHANGE UP (ref 0–4)
SAO2 % BLDV: 98.3 % — SIGNIFICANT CHANGE UP
SODIUM SERPL-SCNC: 135 MMOL/L — SIGNIFICANT CHANGE UP (ref 135–145)
SP GR SPEC: 1.01 — SIGNIFICANT CHANGE UP (ref 1–1.03)
SPERM AB SPEC-ACNC: PRESENT
SQUAMOUS # UR AUTO: 0 /HPF — SIGNIFICANT CHANGE UP (ref 0–5)
T4 FREE SERPL-MCNC: 1 NG/DL — SIGNIFICANT CHANGE UP (ref 0.9–1.8)
THC UR QL: NEGATIVE — SIGNIFICANT CHANGE UP
UROBILINOGEN FLD QL: 0.2 MG/DL — SIGNIFICANT CHANGE UP (ref 0.2–1)
WBC # BLD: 8.47 K/UL — SIGNIFICANT CHANGE UP (ref 3.8–10.5)
WBC # FLD AUTO: 8.47 K/UL — SIGNIFICANT CHANGE UP (ref 3.8–10.5)
WBC UR QL: 1 /HPF — SIGNIFICANT CHANGE UP (ref 0–5)

## 2024-02-26 PROCEDURE — 95720 EEG PHY/QHP EA INCR W/VEEG: CPT

## 2024-02-26 PROCEDURE — 99291 CRITICAL CARE FIRST HOUR: CPT

## 2024-02-26 PROCEDURE — 70450 CT HEAD/BRAIN W/O DYE: CPT | Mod: 26,MC

## 2024-02-26 PROCEDURE — 93010 ELECTROCARDIOGRAM REPORT: CPT

## 2024-02-26 PROCEDURE — 71045 X-RAY EXAM CHEST 1 VIEW: CPT | Mod: 26

## 2024-02-26 PROCEDURE — 99222 1ST HOSP IP/OBS MODERATE 55: CPT

## 2024-02-26 PROCEDURE — 99223 1ST HOSP IP/OBS HIGH 75: CPT

## 2024-02-26 RX ORDER — LANOLIN ALCOHOL/MO/W.PET/CERES
3 CREAM (GRAM) TOPICAL AT BEDTIME
Refills: 0 | Status: DISCONTINUED | OUTPATIENT
Start: 2024-02-26 | End: 2024-02-28

## 2024-02-26 RX ORDER — CHOLECALCIFEROL (VITAMIN D3) 125 MCG
1 CAPSULE ORAL
Refills: 0 | DISCHARGE

## 2024-02-26 RX ORDER — LOSARTAN POTASSIUM 100 MG/1
50 TABLET, FILM COATED ORAL DAILY
Refills: 0 | Status: DISCONTINUED | OUTPATIENT
Start: 2024-02-26 | End: 2024-02-28

## 2024-02-26 RX ORDER — SODIUM CHLORIDE 9 MG/ML
1000 INJECTION INTRAMUSCULAR; INTRAVENOUS; SUBCUTANEOUS ONCE
Refills: 0 | Status: COMPLETED | OUTPATIENT
Start: 2024-02-26 | End: 2024-02-26

## 2024-02-26 RX ORDER — IRBESARTAN AND HYDROCHLOROTHIAZIDE 12.5; 3 MG/1; MG/1
1 TABLET ORAL
Refills: 0 | DISCHARGE

## 2024-02-26 RX ORDER — CARVEDILOL PHOSPHATE 80 MG/1
1 CAPSULE, EXTENDED RELEASE ORAL
Refills: 0 | DISCHARGE

## 2024-02-26 RX ORDER — ONDANSETRON 8 MG/1
4 TABLET, FILM COATED ORAL EVERY 8 HOURS
Refills: 0 | Status: DISCONTINUED | OUTPATIENT
Start: 2024-02-26 | End: 2024-02-28

## 2024-02-26 RX ORDER — CARVEDILOL PHOSPHATE 80 MG/1
12.5 CAPSULE, EXTENDED RELEASE ORAL EVERY 12 HOURS
Refills: 0 | Status: DISCONTINUED | OUTPATIENT
Start: 2024-02-26 | End: 2024-02-28

## 2024-02-26 RX ORDER — ENOXAPARIN SODIUM 100 MG/ML
40 INJECTION SUBCUTANEOUS EVERY 24 HOURS
Refills: 0 | Status: DISCONTINUED | OUTPATIENT
Start: 2024-02-26 | End: 2024-02-28

## 2024-02-26 RX ORDER — CHOLECALCIFEROL (VITAMIN D3) 125 MCG
1000 CAPSULE ORAL DAILY
Refills: 0 | Status: DISCONTINUED | OUTPATIENT
Start: 2024-02-26 | End: 2024-02-28

## 2024-02-26 RX ORDER — ACETAMINOPHEN 500 MG
650 TABLET ORAL EVERY 6 HOURS
Refills: 0 | Status: DISCONTINUED | OUTPATIENT
Start: 2024-02-26 | End: 2024-02-28

## 2024-02-26 RX ADMIN — Medication 650 MILLIGRAM(S): at 17:33

## 2024-02-26 RX ADMIN — CARVEDILOL PHOSPHATE 12.5 MILLIGRAM(S): 80 CAPSULE, EXTENDED RELEASE ORAL at 17:57

## 2024-02-26 RX ADMIN — SODIUM CHLORIDE 1000 MILLILITER(S): 9 INJECTION INTRAMUSCULAR; INTRAVENOUS; SUBCUTANEOUS at 07:48

## 2024-02-26 RX ADMIN — Medication 650 MILLIGRAM(S): at 15:25

## 2024-02-26 NOTE — H&P ADULT - HISTORY OF PRESENT ILLNESS
59 y/o M with PMHx of HTN presents w/ seizure-like activity. Pt does not recall what happened. As per ER provider who spoke with his partner, pt was grunting in his sleep, and was noticed to have full-body shaking with stiff arms, foaming out of the mouth for 10 minutes. he bit his tongue and had urinary incontinence during the episode. No new meds, no fevers or any other symptoms recently. no drug use or alcohol abuse. Currently, pt is AOOx4 and at his baseline. Patient was seen by neurology in ER and recommended admission for continuous EEG.

## 2024-02-26 NOTE — H&P ADULT - NSICDXPASTSURGICALHX_GEN_ALL_CORE_FT
PAST SURGICAL HISTORY:  Elbow Joint Pain s/p surgery    H/O shoulder surgery     History of back surgery     S/P Arthroscopy of Right Knee

## 2024-02-26 NOTE — ED PROVIDER NOTE - ATTENDING CONTRIBUTION TO CARE
I, Rhianna Alberto DO, have personally provided 40 minutes of critical care time exclusive of time spent on separately billable procedures. Time includes review of laboratory data, radiology results, discussion with consultants, and monitoring for potential decompensation. Interventions were performed as documented above.    I personally saw the patient with the resident, and completed the key components of the history and physical exam. I then discussed the management plan with the resident.     58-year-old male with past medical history of TIA, hypertension presents for seizure-like activity that occurred this morning while sleeping, per wife patient started grunting in his sleep, full body convulsions, red in the face, foaming at the mouth for about 4 minutes, with prolonged postictal..  Patient remembers waking up with EMS around him and being asked to walk to the stretcher, which he was able to do.  Patient never followed up with neurology after his ED visit in December 2023, but wife says yesterday patient was very confused, repeatedly asking her and their daughter the same question over and over minutes apart without recollection of the previous question.  Currently, patient only complains of diffuse body aches.  Has not been sick recently, no recent alcohol use, does not use illicit drugs, no recent medication changes.      NAD, mildly disheveled petechia around the left eye, CN II through XII symmetrically intact, no tongue lesions, RRR, lungs CTA B/L, ABD soft, nontender, nondistended, strength/sensation symmetrically intact x 4 extremities.  Patient able to ambulate.      Priority CT called for new onset seizure with altered mental status with no discernible history.,  Will check labs, neuro consults and patient will likely require admission.

## 2024-02-26 NOTE — ED ADULT NURSE NOTE - OBJECTIVE STATEMENT
Pt presents to the ED A&Ox4 BIBA for seizure like activity. Pt states he does not remember what happened, he went to bed then remembers walking out of the house with Pt presents to the ED A&Ox4 BIBA for seizure like activity. Pt states he does not remember what happened, he went to bed then remembers walking out of the house with EMS. Pt c/o all over body aches. Pt SO at bedside reports he was shaking Pt presents to the ED A&Ox4 BIBA for seizure like activity. Pt states he does not remember what happened, he went to bed then remembers walking out of the house with EMS. Pt c/o all over body aches. Pt SO at bedside reports he was shaking, red and making a gasping breathing noise for 4 minutes. When he became responsive again she says he was confused. Pt reports PMH TIA and HTN. Denies recent illness, cough, fever and chills. Pt presents to the ED A&Ox4 BIBA for seizure like activity. Pt states he does not remember what happened, he went to bed then remembers walking out of the house with EMS. Pt c/o all over body aches. Pt SO at bedside reports he was shaking, red and making a gasping breathing noise for 4 minutes. When he became responsive again she says he was confused. Pt SO also reports confusion yesterday, pt kept asking the same questions. Pt reports PMH TIA and HTN. Denies recent illness, cough, fever and chills.

## 2024-02-26 NOTE — H&P ADULT - ASSESSMENT
57 y/o M with PMHx of HTN presents w/ seizure-like activity. Pt does not recall what happened. As per ER provider who spoke with his partner, pt was grunting in his sleep, and was noticed to have full-body shaking with stiff arms, foaming out of the mouth for 10 minutes. he bit his tongue and had urinary incontinence during the episode. No new meds, no fevers or any other symptoms recently. no drug use or alcohol abuse. Currently, pt is AOOx4 and at his baseline. Patient was seen by neurology in ER and recommended admission for continuous video EEG.    #. new onset seizure. continuous video EEG. will be admitted to 6T EMU. no AED for now. ativan PRN for seizure    #. HTN. BP controlled. c/w carvedilol, losartan and HCTZ.    #. DVT prophylaxis: lovenox

## 2024-02-26 NOTE — ED ADULT TRIAGE NOTE - CHIEF COMPLAINT QUOTE
pt BIBEMS from home as per wife possible seizure like activity where he was shaking and foaming out of the mouth. pt declines history and drug or alcohol use states this is the first time this ever happen to him currently only complaining of calf pain ekf and glucose obtained prior to triage pt arrive with 18G in left arm by EMS

## 2024-02-26 NOTE — H&P ADULT - NSHPPHYSICALEXAM_GEN_ALL_CORE
General: no acute distress  HEENT: normocephalic, atrauamtic  Lungs: clear to asucultation  CNS: AAOx3. no focal deficit.

## 2024-02-26 NOTE — ED PROVIDER NOTE - CLINICAL SUMMARY MEDICAL DECISION MAKING FREE TEXT BOX
59 y/o M with PMHx cervical herniated discs, TIA, palpitations, who presents w/ seizure-like activity. Pt does not recall what occurred, recalls walking out of home and being carried to ambulance by EMS. As per partner on phone, pt was grunting in his sleep, full-body shaking with stiff arms, foaming out of the mouth for 10 minutes. Pt does not recall this. Currently, pt is AOOx4 and at his baseline. Pt was at [facility] 12/23 and code stroke was called, all workup negative, given follow up to neurology and did not follow up. States this is the first time this has happened and currently, only complaint is b/l Charley horses.   Denies alcohol, smoking, and drug use.     Likely first-time seizure. VSS, no focal neurological deficits, AOOx4. , ECG is NSR with normal intervals/axis, no changes in QRS, no ST/T changes. Pending CBC CMP to assess for electrolyte abnormalities/ metabolic/ infectious etiology.

## 2024-02-26 NOTE — ED PROVIDER NOTE - OBJECTIVE STATEMENT
59 y/o M with PMHx cervical herniated discs, TIA, palpitations, who presents w/ seizure-like activity. Pt does not recall what occurred, recalls walking out of home and being carried to ambulance by EMS. As per partner on phone, pt was grunting in his sleep, full-body shaking with stiff arms, foaming out of the mouth for 10 minutes. Pt does not recall this. Currently, pt is AOOx4 and at his baseline. Pt was at [facility] 12/23 and code stroke was called, all workup negative, given follow up to neurology and did not follow up. States this is the first time this has happened and currently, only complaint is b/l Charley horses.   Denies alcohol, smoking, and drug use.

## 2024-02-26 NOTE — CONSULT NOTE ADULT - SUBJECTIVE AND OBJECTIVE BOX
[facility]                                     Neurology at [place]                                 Abram Mahoney, & Keshav                                  [address]                                        [place], [place], [address]                                             [phone]    CC: seizure  HPI:  The patient is a 58y Male who presented with new seizure like activity.  He was sleeping and wife noticed that he was talking/yeling in sleep, then she went over to his side of bed and saw him unresponsive, with hands clenched , with eyes upward gaze and head deviated to left.  He had tongue bite and urine incontinence.  He had short post ictal confusion and does not remember incident.  He had episodes of confusion earlier in the day yesterday.  he has nor personal or family history of seizure.  Neurology is asked to evaluate.    PAST MEDICAL & SURGICAL HISTORY:  HTN  prediabetes      Herniated Disc  c-4-5  C5-6      S/P Arthroscopy of Right Knee      Elbow Joint Pain  s/p surgery          MEDICATIONS  (STANDING):    MEDICATIONS  (PRN):      Allergies    No Known Allergies    Intolerances      SOCIAL HISTORY:  no tob,   occasional alcohol   no drugs    FAMILY HISTORY:    no seizures    ROS: 14 point ROS negative other than what is present in HPI or below    Vital Signs Last 24 Hrs  T(C): 36.8 (26 Feb 2024 07:25), Max: 36.8 (26 Feb 2024 06:35)  T(F): 98.2 (26 Feb 2024 07:25), Max: 98.2 (26 Feb 2024 06:35)  HR: 80 (26 Feb 2024 07:25) (80 - 91)  BP: 141/75 (26 Feb 2024 07:25) (141/75 - 155/82)  BP(mean): --  RR: 17 (26 Feb 2024 07:25) (17 - 24)  SpO2: 94% (26 Feb 2024 07:25) (94% - 94%)    Parameters below as of 26 Feb 2024 07:25  Patient On (Oxygen Delivery Method): room air      General: NAD    Detailed Neurologic Exam:    Mental status: The patient is awake and alert and has normal attention span.  The patient is fully oriented in 3 spheres.  The patient is able to name objects, follow commands, repeat sentences.    Cranial nerves: Pupils equal and react symmetrically to light. There is no visual field deficit to confrontation. Extraocular motion is full with no nystagmus. There is no ptosis. Facial sensation is intact. Facial musculature is symmetric. Palate elevates symmetrically. Tongue is midline.    Motor: There is normal bulk and tone.  There is no tremor.  Strength is 5/5 in the right arm and leg.   Strength is 5/5 in the left arm and leg.    Sensation: Intact to light touch and pin in 4 extremities    Reflexes: 2+ throughout and plantar responses are flexor.    Cerebellar: There is no dysmetria on finger to nose testing.    Gait : deferred    LABS:                         14.6   8.47  )-----------( 146      ( 26 Feb 2024 07:45 )             42.1       02-26    135  |  99  |  12.2  ----------------------------<  132<H>  4.1   |  22.0  |  1.09    Ca    9.1      26 Feb 2024 07:45    TPro  6.6  /  Alb  4.2  /  TBili  0.6  /  DBili  x   /  AST  36  /  ALT  33  /  AlkPhos  96  02-26      PT/INR - ( 26 Feb 2024 08:20 )   PT: 11.6 sec;   INR: 1.05 ratio         PTT - ( 26 Feb 2024 08:20 )  PTT:45.3 sec    RADIOLOGY & ADDITIONAL STUDIES (independently reviewed unless otherwise noted):  CT Head No Cont (02.26.24 @ 08:20)   IMPRESSION: No evidence of an acute transcortical infarction or   hemorrhage.

## 2024-02-26 NOTE — CONSULT NOTE ADULT - ASSESSMENT
The patient is a 58y Male who is followed by neurology because of new onset seizure.  Unclear etiology.  Gebneralized seizure with tongue bite and incontinence    Seizure  admit to EMU- 6T high side logistics notified)  continuous video EEG  no anti seizure meds at this time  seizure precautions  prn benzodiazepine for seizure    Discussed with patient no driving until cleared by neurology    will follow with you    Zeus Valverde MD PhD   [id number]   Clindamycin Counseling: I counseled the patient regarding use of clindamycin as an antibiotic for prophylactic and/or therapeutic purposes. Clindamycin is active against numerous classes of bacteria, including skin bacteria. Side effects may include nausea, diarrhea, gastrointestinal upset, rash, hives, yeast infections, and in rare cases, colitis.

## 2024-02-26 NOTE — ED ADULT NURSE REASSESSMENT NOTE - NS ED NURSE REASSESS COMMENT FT1
Pt sitting in bed A&Ox4. Denies pain or SOB, RR even and unlabored, NAD noted. Pt awaiting Neurology consult. Bed in lowest locked position, Cardiac and  monitor in place, pt able to make needs known.

## 2024-02-27 ENCOUNTER — TRANSCRIPTION ENCOUNTER (OUTPATIENT)
Age: 59
End: 2024-02-27

## 2024-02-27 LAB
CULTURE RESULTS: SIGNIFICANT CHANGE UP
HCV AB S/CO SERPL IA: 0.17 S/CO — SIGNIFICANT CHANGE UP (ref 0–0.99)
HCV AB SERPL-IMP: SIGNIFICANT CHANGE UP
SPECIMEN SOURCE: SIGNIFICANT CHANGE UP

## 2024-02-27 PROCEDURE — 95720 EEG PHY/QHP EA INCR W/VEEG: CPT

## 2024-02-27 PROCEDURE — 99232 SBSQ HOSP IP/OBS MODERATE 35: CPT

## 2024-02-27 RX ORDER — ACETAMINOPHEN 500 MG
2 TABLET ORAL
Qty: 0 | Refills: 0 | DISCHARGE
Start: 2024-02-27

## 2024-02-27 RX ORDER — LEVETIRACETAM 250 MG/1
750 TABLET, FILM COATED ORAL
Refills: 0 | Status: DISCONTINUED | OUTPATIENT
Start: 2024-02-27 | End: 2024-02-28

## 2024-02-27 RX ADMIN — LOSARTAN POTASSIUM 50 MILLIGRAM(S): 100 TABLET, FILM COATED ORAL at 05:58

## 2024-02-27 RX ADMIN — CARVEDILOL PHOSPHATE 12.5 MILLIGRAM(S): 80 CAPSULE, EXTENDED RELEASE ORAL at 17:27

## 2024-02-27 RX ADMIN — LEVETIRACETAM 750 MILLIGRAM(S): 250 TABLET, FILM COATED ORAL at 17:27

## 2024-02-27 RX ADMIN — Medication 1000 UNIT(S): at 12:29

## 2024-02-27 RX ADMIN — ENOXAPARIN SODIUM 40 MILLIGRAM(S): 100 INJECTION SUBCUTANEOUS at 05:58

## 2024-02-27 RX ADMIN — CARVEDILOL PHOSPHATE 12.5 MILLIGRAM(S): 80 CAPSULE, EXTENDED RELEASE ORAL at 05:58

## 2024-02-27 NOTE — DISCHARGE NOTE PROVIDER - CARE PROVIDER_API CALL
Zeus Valverde  Neurology  [address] [place] [address], [facility] [address]  [place] [address]  Phone: [phone]  Fax: [phone]  Follow Up Time: 2 weeks

## 2024-02-27 NOTE — DISCHARGE NOTE PROVIDER - NSDCMRMEDTOKEN_GEN_ALL_CORE_FT
acetaminophen 325 mg oral tablet: 2 tab(s) orally every 6 hours As needed Temp greater or equal to 38C (100.4F), Mild Pain (1 - 3)  carvedilol 20 mg oral capsule, extended release: 1 cap(s) orally once a day  cholecalciferol 25 mcg (1000 intl units) oral capsule: 1 cap(s) orally once a day  irbesartan-hydrochlorothiazide 150 mg-12.5 mg oral tablet: 1 tab(s) orally once a day   acetaminophen 325 mg oral tablet: 2 tab(s) orally every 6 hours As needed Temp greater or equal to 38C (100.4F), Mild Pain (1 - 3)  carvedilol 20 mg oral capsule, extended release: 1 cap(s) orally once a day  cholecalciferol 25 mcg (1000 intl units) oral capsule: 1 cap(s) orally once a day  irbesartan-hydrochlorothiazide 150 mg-12.5 mg oral tablet: 1 tab(s) orally once a day  levETIRAcetam 750 mg oral tablet: 1 tab(s) orally 2 times a day

## 2024-02-27 NOTE — DISCHARGE NOTE PROVIDER - HOSPITAL COURSE
The patient is a 58 year old male with a past medical history of hypertension who was brought to the Er by EMS after a witnessed seizure.  In the ER, CT head was negative. UTox negative, BAL negative. Chest xray and UA were negative. Thyroid panel WNL.  Seizure noted in the right frontotemporal region on continuos EEG started on PO Keppra. Patient instructed no driving until cleared by neurology.

## 2024-02-27 NOTE — EEG REPORT - NS EEG TEXT BOX
DAY 1 	START: 2/26/2024  4:32:03 PM     	END: 2/27/2024  08:00  	DURATION: 15 HR     DAILY EEG VISUAL ANALYSIS    The background was continuous, symmetric, spontaneously variable and reactive. During wakefulness, the posterior dominant rhythm consisted of symmetric, well-modulated 8 Hz activity, with amplitude to 30 uV, that attenuated to eye opening.  Low amplitude frontal beta was noted in wakefulness.   The anterior to posterior gradient was present.     BACKGROUND SLOWING:  No generalized background slowing was present.    FOCAL SLOWING:   Intermittent shifting bi-temporal irregular right > left delta activity.     SLEEP BACKGROUND:  Drowsiness was characterized by fragmentation, attenuation, and slowing of the background activity.    Sleep was characterized by the presence of vertex waves, symmetric sleep spindles and K-complexes.    OTHER NON-EPILEPTIFORM FINDINGS:  None were present.    ACTIVATION PROCEDURES:   Hyperventilation was performed and did not elicit any abnormalities.    Photic stimulation was performed and did not elicit any abnormalities.      INTERICTAL EPILEPTIFORM ACTIVITY:   Occasional sharp waves seen over the right frontotemporal region with maximal negativity at F8.     EVENTS:  One electrographic seizure captured around 22:44 that was characterized by a rhythmical and sharply contoured delta activity over the right fronto-temporal region that lasted around 30 seconds with subtle spread to the posterior temporal region. Clinically the patient is asleep and has a brief arousal, no other clear seizure like activity.  There is also a mild increase in hear rate during the seizure.     ARTIFACTS:  Intermittent myogenic and movement artifacts were noted.    ECG:  The heart rate on single channel ECG was predominantly between 70-80 BPM.    ASMs:   None    -------------------------------------------------------------------------------------------------------------------------------------------------------  EEG SUMMARY:  Abnormal EEG in the awake, drowsy and asleep states.  -	One electrographic seizure arising from the right frontotemporal region.   -	Occasional sharp waves seen over the right frontotemporal region, with maximal negativity at F8.   -	Intermittent irregular slowing, focal, bi-temporal (right > left).      -------------------------------------------------------------------------------------------------------------------------------------------------------  IMPRESSION/CLINICAL CORRELATE:  This is an abnormal EEG record.   1.	One electrographic focal onset seizure arising from the right frontotemporal region.   2.	Increased risk of seizures arising from the right frontotemporal region.  3.	Functional cerebral dysfunction seen in the bi-temporal regions (R>L)    Peter Charlton MD  Fellow, [facility]     DAY 1 	START: 2/26/2024  4:32:03 PM     	END: 2/27/2024  08:00  	DURATION: 15 HR     DAILY EEG VISUAL ANALYSIS    The background was continuous, symmetric, spontaneously variable and reactive. During wakefulness, the posterior dominant rhythm consisted of symmetric, well-modulated 8 Hz activity, with amplitude to 30 uV, that attenuated to eye opening.  Low amplitude frontal beta was noted in wakefulness.   The anterior to posterior gradient was present.     BACKGROUND SLOWING:  No generalized background slowing was present.    FOCAL SLOWING:   Intermittent shifting bi-temporal irregular right > left delta activity.     SLEEP BACKGROUND:  Drowsiness was characterized by fragmentation, attenuation, and slowing of the background activity.    Sleep was characterized by the presence of vertex waves, symmetric sleep spindles and K-complexes.    OTHER NON-EPILEPTIFORM FINDINGS:  None were present.    ACTIVATION PROCEDURES:   Hyperventilation was performed and did not elicit any abnormalities.    Photic stimulation was performed and did not elicit any abnormalities.      INTERICTAL EPILEPTIFORM ACTIVITY:   Occasional sharp waves seen over the right frontotemporal region with maximal negativity at F8.     EVENTS:  One electrographic seizure captured around 22:44 that was characterized by a rhythmical and sharply contoured delta activity over the right fronto-temporal region that lasted around 30 seconds with subtle spread to the posterior temporal region. Clinically the patient is asleep and has a brief arousal, no other clear seizure like activity.  There is also a mild increase in hear rate during the seizure.     ARTIFACTS:  Intermittent myogenic and movement artifacts were noted.    ECG:  The heart rate on single channel ECG was predominantly between 70-80 BPM.    ASMs:   None    -------------------------------------------------------------------------------------------------------------------------------------------------------  EEG SUMMARY:  Abnormal EEG in the awake, drowsy and asleep states.  -	One electrographic seizure arising from the right frontotemporal region.   -	Occasional sharp waves seen over the right frontotemporal region, with maximal negativity at F8.   -	Intermittent irregular slowing, focal, bi-temporal (right > left).    -------------------------------------------------------------------------------------------------------------------------------------------------------  IMPRESSION/CLINICAL CORRELATE:  This is an abnormal EEG record.   1.	One electrographic focal onset seizure arising from the right frontotemporal region.   2.	Increased risk of seizures arising from the right frontotemporal region.  3.	Functional cerebral dysfunction seen in the bi-temporal regions (R>L)    Peter Charlton MD  Fellow, [facility]    Toni Lomax MD PhD  Director, Epilepsy Division, [facility] EEG Reading Room Ph#: [phone]  Epilepsy Answering Service after 5PM and before 8:30AM: Ph#: [phone]

## 2024-02-27 NOTE — DISCHARGE NOTE PROVIDER - NSDCCPCAREPLAN_GEN_ALL_CORE_FT
PRINCIPAL DISCHARGE DIAGNOSIS  Diagnosis: Seizure  Assessment and Plan of Treatment:      PRINCIPAL DISCHARGE DIAGNOSIS  Diagnosis: Seizure  Assessment and Plan of Treatment: Continue PO Keppra as prescribed  Follow up with neurology in 1-2 weeks

## 2024-02-28 ENCOUNTER — TRANSCRIPTION ENCOUNTER (OUTPATIENT)
Age: 59
End: 2024-02-28

## 2024-02-28 VITALS
OXYGEN SATURATION: 96 % | TEMPERATURE: 98 F | SYSTOLIC BLOOD PRESSURE: 128 MMHG | HEART RATE: 66 BPM | DIASTOLIC BLOOD PRESSURE: 77 MMHG | RESPIRATION RATE: 20 BRPM

## 2024-02-28 PROCEDURE — 71045 X-RAY EXAM CHEST 1 VIEW: CPT

## 2024-02-28 PROCEDURE — 85610 PROTHROMBIN TIME: CPT

## 2024-02-28 PROCEDURE — 84295 ASSAY OF SERUM SODIUM: CPT

## 2024-02-28 PROCEDURE — 99291 CRITICAL CARE FIRST HOUR: CPT

## 2024-02-28 PROCEDURE — 82962 GLUCOSE BLOOD TEST: CPT

## 2024-02-28 PROCEDURE — 81001 URINALYSIS AUTO W/SCOPE: CPT

## 2024-02-28 PROCEDURE — 86900 BLOOD TYPING SEROLOGIC ABO: CPT

## 2024-02-28 PROCEDURE — 86850 RBC ANTIBODY SCREEN: CPT

## 2024-02-28 PROCEDURE — 80307 DRUG TEST PRSMV CHEM ANLYZR: CPT

## 2024-02-28 PROCEDURE — 95714 VEEG EA 12-26 HR UNMNTR: CPT

## 2024-02-28 PROCEDURE — 84132 ASSAY OF SERUM POTASSIUM: CPT

## 2024-02-28 PROCEDURE — 82330 ASSAY OF CALCIUM: CPT

## 2024-02-28 PROCEDURE — 82947 ASSAY GLUCOSE BLOOD QUANT: CPT

## 2024-02-28 PROCEDURE — 84443 ASSAY THYROID STIM HORMONE: CPT

## 2024-02-28 PROCEDURE — 93005 ELECTROCARDIOGRAM TRACING: CPT

## 2024-02-28 PROCEDURE — 83605 ASSAY OF LACTIC ACID: CPT

## 2024-02-28 PROCEDURE — 95711 VEEG 2-12 HR UNMONITORED: CPT

## 2024-02-28 PROCEDURE — 70450 CT HEAD/BRAIN W/O DYE: CPT | Mod: MC

## 2024-02-28 PROCEDURE — 85018 HEMOGLOBIN: CPT

## 2024-02-28 PROCEDURE — 99232 SBSQ HOSP IP/OBS MODERATE 35: CPT

## 2024-02-28 PROCEDURE — 99238 HOSP IP/OBS DSCHRG MGMT 30/<: CPT

## 2024-02-28 PROCEDURE — 82435 ASSAY OF BLOOD CHLORIDE: CPT

## 2024-02-28 PROCEDURE — 85025 COMPLETE CBC W/AUTO DIFF WBC: CPT

## 2024-02-28 PROCEDURE — 86803 HEPATITIS C AB TEST: CPT

## 2024-02-28 PROCEDURE — 82803 BLOOD GASES ANY COMBINATION: CPT

## 2024-02-28 PROCEDURE — 80053 COMPREHEN METABOLIC PANEL: CPT

## 2024-02-28 PROCEDURE — 95700 EEG CONT REC W/VID EEG TECH: CPT

## 2024-02-28 PROCEDURE — 84436 ASSAY OF TOTAL THYROXINE: CPT

## 2024-02-28 PROCEDURE — 87086 URINE CULTURE/COLONY COUNT: CPT

## 2024-02-28 PROCEDURE — 84439 ASSAY OF FREE THYROXINE: CPT

## 2024-02-28 PROCEDURE — 86901 BLOOD TYPING SEROLOGIC RH(D): CPT

## 2024-02-28 PROCEDURE — 85730 THROMBOPLASTIN TIME PARTIAL: CPT

## 2024-02-28 PROCEDURE — 84480 ASSAY TRIIODOTHYRONINE (T3): CPT

## 2024-02-28 PROCEDURE — 95718 EEG PHYS/QHP 2-12 HR W/VEEG: CPT

## 2024-02-28 PROCEDURE — 36415 COLL VENOUS BLD VENIPUNCTURE: CPT

## 2024-02-28 PROCEDURE — 85014 HEMATOCRIT: CPT

## 2024-02-28 RX ORDER — LEVETIRACETAM 250 MG/1
1 TABLET, FILM COATED ORAL
Qty: 60 | Refills: 0
Start: 2024-02-28 | End: 2024-03-28

## 2024-02-28 RX ADMIN — ENOXAPARIN SODIUM 40 MILLIGRAM(S): 100 INJECTION SUBCUTANEOUS at 06:33

## 2024-02-28 RX ADMIN — LOSARTAN POTASSIUM 50 MILLIGRAM(S): 100 TABLET, FILM COATED ORAL at 06:36

## 2024-02-28 RX ADMIN — Medication 1000 UNIT(S): at 14:34

## 2024-02-28 RX ADMIN — LEVETIRACETAM 750 MILLIGRAM(S): 250 TABLET, FILM COATED ORAL at 06:40

## 2024-02-28 RX ADMIN — CARVEDILOL PHOSPHATE 12.5 MILLIGRAM(S): 80 CAPSULE, EXTENDED RELEASE ORAL at 06:36

## 2024-02-28 NOTE — EEG REPORT - NS EEG TEXT BOX
DAY 2 	START: 2/27/2024  0800 AM     	END: 2/28/2024  08:00 AM  	DURATION: 24 HR     DAILY EEG VISUAL ANALYSIS    The background was continuous, symmetric, spontaneously variable and reactive. During wakefulness, the posterior dominant rhythm consisted of symmetric, well-modulated 8 Hz activity, with amplitude to 30 uV, that attenuated to eye opening.  Low amplitude frontal beta was noted in wakefulness.   The anterior to posterior gradient was present.     BACKGROUND SLOWING:  No generalized background slowing was present.    FOCAL SLOWING:   Intermittent shifting bi-temporal irregular right > left delta activity.     SLEEP BACKGROUND:  Drowsiness was characterized by fragmentation, attenuation, and slowing of the background activity.    Sleep was characterized by the presence of vertex waves, symmetric sleep spindles and K-complexes.    OTHER NON-EPILEPTIFORM FINDINGS:  None were present.    ACTIVATION PROCEDURES:   Hyperventilation was performed and did not elicit any abnormalities.    Photic stimulation was performed and did not elicit any abnormalities.      INTERICTAL EPILEPTIFORM ACTIVITY:   Rare sharp waves seen over the right frontotemporal region with maximal negativity at F8.     EVENTS:  None    ARTIFACTS:  Intermittent myogenic and movement artifacts were noted.    ECG:  The heart rate on single channel ECG was predominantly between 70-80 BPM.    ASMs:   Keppra 750 mg BID    -------------------------------------------------------------------------------------------------------------------------------------------------------  EEG SUMMARY:  Abnormal EEG in the awake, drowsy and asleep states.  -	One electrographic seizure arising from the right frontotemporal region on the 2/26/24.   -	Occasional sharp waves seen over the right frontotemporal region, with maximal negativity at F8.   -	Intermittent irregular slowing, focal, bi-temporal (right > left).      -------------------------------------------------------------------------------------------------------------------------------------------------------  IMPRESSION/CLINICAL CORRELATE:  This is an abnormal EEG record.   1.	One electrographic focal onset seizure arising from the right frontotemporal region.   2.	Increased risk of seizures arising from the right frontotemporal region.  3.	Functional cerebral dysfunction seen in the bi-temporal regions (R>L)    Peter Charlton MD  Fellow, [facility] [place]   DAY 2 	START: 2/27/2024  0800 AM     	END: 2/28/2024  11:00 AM  	DURATION: 27 HR     DAILY EEG VISUAL ANALYSIS    The background was continuous, symmetric, spontaneously variable and reactive. During wakefulness, the posterior dominant rhythm consisted of symmetric, well-modulated 8 Hz activity, with amplitude to 30 uV, that attenuated to eye opening.  Low amplitude frontal beta was noted in wakefulness.   The anterior to posterior gradient was present.     BACKGROUND SLOWING:  No generalized background slowing was present.    FOCAL SLOWING:   Intermittent shifting bi-temporal irregular right > left delta activity.     SLEEP BACKGROUND:  Drowsiness was characterized by fragmentation, attenuation, and slowing of the background activity.    Sleep was characterized by the presence of vertex waves, symmetric sleep spindles and K-complexes.    OTHER NON-EPILEPTIFORM FINDINGS:  None were present.    ACTIVATION PROCEDURES:   Hyperventilation was performed and did not elicit any abnormalities.    Photic stimulation was performed and did not elicit any abnormalities.      INTERICTAL EPILEPTIFORM ACTIVITY:   Rare sharp waves seen over the right frontotemporal region with maximal negativity at F8.     EVENTS:  None    ARTIFACTS:  Intermittent myogenic and movement artifacts were noted.    ECG:  The heart rate on single channel ECG was predominantly between 70-80 BPM.    ASMs:   Keppra 750 mg BID    -------------------------------------------------------------------------------------------------------------------------------------------------------  EEG SUMMARY:  Abnormal EEG in the awake, drowsy and asleep states.  -	One electrographic seizure arising from the right frontotemporal region on the 2/26/24.   -	Occasional sharp waves seen over the right frontotemporal region, with maximal negativity at F8.   -	Intermittent irregular slowing, focal, bi-temporal (right > left).    -------------------------------------------------------------------------------------------------------------------------------------------------------  IMPRESSION/CLINICAL CORRELATE:  This is an abnormal EEG record.   1.	One electrographic focal onset seizure arising from the right frontotemporal region.   2.	Increased risk of seizures arising from the right frontotemporal region.  3.	Functional cerebral dysfunction seen in the bi-temporal regions (R>L)    Peter Charlton MD  Fellow, [facility] [place]   DAY 2 	START: 2/27/2024  0800 AM     	END: 2/28/2024  11:00 AM  	DURATION: 27 HR     DAILY EEG VISUAL ANALYSIS    The background was continuous, symmetric, spontaneously variable and reactive. During wakefulness, the posterior dominant rhythm consisted of symmetric, well-modulated 8 Hz activity, with amplitude to 30 uV, that attenuated to eye opening.  Low amplitude frontal beta was noted in wakefulness.   The anterior to posterior gradient was present.     BACKGROUND SLOWING:  No generalized background slowing was present.    FOCAL SLOWING:   Intermittent shifting bi-temporal irregular right > left delta activity.     SLEEP BACKGROUND:  Drowsiness was characterized by fragmentation, attenuation, and slowing of the background activity.    Sleep was characterized by the presence of vertex waves, symmetric sleep spindles and K-complexes.    OTHER NON-EPILEPTIFORM FINDINGS:  None were present.    ACTIVATION PROCEDURES:   Hyperventilation was performed and did not elicit any abnormalities.    Photic stimulation was performed and did not elicit any abnormalities.      INTERICTAL EPILEPTIFORM ACTIVITY:   Rare sharp waves seen over the right frontotemporal region with maximal negativity at F8.     EVENTS:  None    ARTIFACTS:  Intermittent myogenic and movement artifacts were noted.    ECG:  The heart rate on single channel ECG was predominantly between 70-80 BPM.    ASMs:   Keppra 750 mg BID    -------------------------------------------------------------------------------------------------------------------------------------------------------  EEG SUMMARY:  Abnormal EEG in the awake, drowsy and asleep states.  -	No seizure in last 24h  -	One electrographic seizure arising from the right frontotemporal region overnight on 2/26-2/27.   -	Occasional sharp waves seen over the right frontotemporal region, with maximal negativity at F8.   -	Intermittent irregular slowing, focal, bi-temporal (right > left).    -------------------------------------------------------------------------------------------------------------------------------------------------------  IMPRESSION/CLINICAL CORRELATE:  This is an abnormal EEG record.   1.	No seizure in last 24h, one electrographic focal onset seizure arising from the right frontotemporal region overnight on 2/26-2/27.   2.	Increased risk of seizures arising from the right frontotemporal region.  3.	Functional cerebral dysfunction seen in the bi-temporal regions (R>L)    Peter Charlton MD  Fellow, [facility]    Toni Lomax MD PhD  Director, Epilepsy Division, [facility] EEG Reading Room Ph#: [phone]  Epilepsy Answering Service after 5PM and before 8:30AM: Ph#: [phone]

## 2024-02-28 NOTE — DISCHARGE NOTE NURSING/CASE MANAGEMENT/SOCIAL WORK - NSDCPEFALRISK_GEN_ALL_CORE
For information on Fall & Injury Prevention, visit: [email or web address]  [email or web address] OR  https://www.cdc.gov/steadi/patient.html

## 2024-02-28 NOTE — PROGRESS NOTE ADULT - ASSESSMENT
The patient is a 58y Male who is followed by neurology because of new onset seizure.  Unclear etiology.  Generalized seizure with tongue bite and incontinence    Seizure  admit to EMU- 6T high side logistics notified)  continuous video EEG showed one seizure on 2/26/24 from right frontotemporal region, occasional  sharps in right frontotemporal region  Continue Keppra 750 mg bid   d/c cvEEG additional  seizure precautions  prn benzodiazepine for seizure    Discussed with patient no driving until cleared by neurology, likely 6-12 months    Nuro cleared for d/c 2/28/24 on keppra 750 mg po bid and outpatient follow ujp.  I have asked him to call my office for follow up and I will arrange for outpatient open MRI as he is extremely claustrophobic.    discussed with Dr Munoz    Thank you for allowing me to participate in the care of your patient    Zeus Valverde MD, PhD   [id number]  
The patient is a 58y Male who is followed by neurology because of new onset seizure.  Unclear etiology.  Gebneralized seizure with tongue bite and incontinence    Seizure  admit to EMU- 6T high side logistics notified)  continuous video EEG showed one seizure overnight from right frontotemporal region, sharps in right frontotemporal region  Keppra 750 mg bid started  will watch cvEEG additional day  seizure precautions  prn benzodiazepine for seizure    Discussed with patient no driving until cleared by neurology, likely 6-12 months    Anticipate d/c tomorrow 2/28/24 pending EEG read    discussed with Dr Munoz    will follow with you    Zeus Valverde MD PhD   [id number]  
The patient is a 58 year old male with a past medical history of hypertension who was brought to the Er by EMS after a witnessed seizure.  In the ER, CT head was negative. UTox negative, BAL negative. Chest xray and UA were negative. Thyroid panel WNL.  Seizure noted on continuos EEG started on PO keppra.     Assessment/Plan:    1 New Onset Seizure  - Started on PO Keppra  - Continue EEG x 24 hours  - Seizure precautions    2. Hypertension  - On Coreg, Losartan and HCTZ    VTE- Lovenox     Anticipate discharge home in 24 hours     
The patient is a 58 year old male with a past medical history of hypertension who was brought to the Er by EMS after a witnessed seizure.  In the ER, CT head was negative. UTox negative, BAL negative. Chest xray and UA were negative. Thyroid panel WNL.  Seizure noted on continuos EEG started on PO keppra. Discharged home to follow up with neurology in 1-2 weeks. Instructed he is not allowed to drive until cleared by neurology     Assessment/Plan:    1 New Onset Seizure  - Started on PO Keppra    2. Hypertension  - On Coreg, Losartan and HCTZ    VTE- Lovenox     Stable for discharge home

## 2024-02-28 NOTE — DISCHARGE NOTE NURSING/CASE MANAGEMENT/SOCIAL WORK - PATIENT PORTAL LINK FT
You can access the FollowMyHealth Patient Portal offered by [facility] by registering at the following website: [email or web address]. By joining [facility]’s FollowMyHealth portal, you will also be able to view your health information using other applications (apps) compatible with our system.

## 2024-02-28 NOTE — PROGRESS NOTE ADULT - SUBJECTIVE AND OBJECTIVE BOX
[facility]                                     Neurology at [place]                                 Abram Mahoney, & Keshav                                  [address]                                        [place], [place], [address]                                             [phone]    CC: seizure  HPI:  The patient is a 58y Male who presented with new seizure like activity.  He was sleeping and wife noticed that he was talking/yeling in sleep, then she went over to his side of bed and saw him unresponsive, with hands clenched , with eyes upward gaze and head deviated to left.  He had tongue bite and urine incontinence.  He had short post ictal confusion and does not remember incident.  He had episodes of confusion earlier in the day yesterday.  he has nor personal or family history of seizure.  Neurology is asked to evaluate.    Interval history: no reported events    Review of systems (neurology): Denies headache or dizziness. Denies weakness/numbness.  Denies speech/language deficits. Denies diplopia/blurred vision.  Denies confusion    MEDICATIONS  (STANDING):  carvedilol 12.5 milliGRAM(s) Oral every 12 hours  cholecalciferol 1000 Unit(s) Oral daily  enoxaparin Injectable 40 milliGRAM(s) SubCutaneous every 24 hours  hydrochlorothiazide 12.5 milliGRAM(s) Oral daily  levETIRAcetam 750 milliGRAM(s) Oral two times a day  losartan 50 milliGRAM(s) Oral daily    MEDICATIONS  (PRN):  acetaminophen     Tablet .. 650 milliGRAM(s) Oral every 6 hours PRN Temp greater or equal to 38C (100.4F), Mild Pain (1 - 3)  aluminum hydroxide/magnesium hydroxide/simethicone Suspension 30 milliLiter(s) Oral every 4 hours PRN Dyspepsia  LORazepam   Injectable 1 milliGRAM(s) IV Push every 6 hours PRN seizure  melatonin 3 milliGRAM(s) Oral at bedtime PRN Insomnia  ondansetron Injectable 4 milliGRAM(s) IV Push every 8 hours PRN Nausea and/or Vomiting      Vital Signs Last 24 Hrs  T(C): 36.9 (28 Feb 2024 12:01), Max: 37 (27 Feb 2024 20:00)  T(F): 98.4 (28 Feb 2024 12:01), Max: 98.6 (27 Feb 2024 20:00)  HR: 67 (28 Feb 2024 12:01) (60 - 73)  BP: 122/73 (28 Feb 2024 12:01) (107/64 - 122/73)  BP(mean): 78 (28 Feb 2024 00:00) (78 - 87)  RR: 18 (28 Feb 2024 04:42) (18 - 18)  SpO2: 96% (28 Feb 2024 12:01) (92% - 99%)    Parameters below as of 28 Feb 2024 12:01  Patient On (Oxygen Delivery Method): room air      Detailed Neurologic Exam:    Mental status: The patient is awake and alert and has normal attention span.  The patient is fully oriented in 3 spheres.  The patient is able to name objects, follow commands, repeat sentences.    Cranial nerves: Pupils equal and react symmetrically to light. There is no visual field deficit to confrontation. Extraocular motion is full with no nystagmus. There is no ptosis. Facial sensation is intact. Facial musculature is symmetric. Palate elevates symmetrically. Tongue is midline.    Motor: There is normal bulk and tone.  There is no tremor.  Strength is 5/5 in the right arm and leg.   Strength is 5/5 in the left arm and leg.    Sensation: Intact to light touch and pin in 4 extremities    Reflexes: 2+ throughout and plantar responses are flexor.    Cerebellar: There is no dysmetria on finger to nose testing.    Gait : deferred    LABS:              EEG SUMMARY 2/27-2/28/24:  Abnormal EEG in the awake, drowsy and asleep states.  -	One electrographic seizure arising from the right frontotemporal region on the 2/26/24.   -	Occasional sharp waves seen over the right frontotemporal region, with maximal negativity at F8.   -	Intermittent irregular slowing, focal, bi-temporal (right > left).    -------------------------------------------------------------------------------------------------------------------------------------------------------  IMPRESSION/CLINICAL CORRELATE:  This is an abnormal EEG record.   1.	One electrographic focal onset seizure arising from the right frontotemporal region.   2.	Increased risk of seizures arising from the right frontotemporal region.  3.	Functional cerebral dysfunction seen in the bi-temporal regions (R>L)    EEG SUMMARY 2/26-2/27/24:  Abnormal EEG in the awake, drowsy and asleep states.  -	One electrographic seizure arising from the right frontotemporal region.   -	Occasional sharp waves seen over the right frontotemporal region, with maximal negativity at F8.   -	Intermittent irregular slowing, focal, bi-temporal (right > left).      -------------------------------------------------------------------------------------------------------------------------------------------------------  IMPRESSION/CLINICAL CORRELATE:  This is an abnormal EEG record.   1.	One electrographic focal onset seizure arising from the right frontotemporal region.   2.	Increased risk of seizures arising from the right frontotemporal region.  3.	Functional cerebral dysfunction seen in the bi-temporal regions (R>L)    RADIOLOGY & ADDITIONAL STUDIES (independently reviewed unless otherwise noted):  CT Head No Cont (02.26.24 @ 08:20)   IMPRESSION: No evidence of an acute transcortical infarction or   hemorrhage.    
                             [facility]                                     Neurology at [place]                                 Abram Mahoney, & Keshav                                  [address]                                        [place], [place], [address]                                             [phone]    CC: seizure  HPI:  The patient is a 58y Male who presented with new seizure like activity.  He was sleeping and wife noticed that he was talking/yeling in sleep, then she went over to his side of bed and saw him unresponsive, with hands clenched , with eyes upward gaze and head deviated to left.  He had tongue bite and urine incontinence.  He had short post ictal confusion and does not remember incident.  He had episodes of confusion earlier in the day yesterday.  he has nor personal or family history of seizure.  Neurology is asked to evaluate.    Interval history: no reported events    Review of systems (neurology): Denies headache or dizziness. Denies weakness/numbness.  Denies speech/language deficits. Denies diplopia/blurred vision.  Denies confusion    MEDICATIONS  (STANDING):  carvedilol 12.5 milliGRAM(s) Oral every 12 hours  cholecalciferol 1000 Unit(s) Oral daily  enoxaparin Injectable 40 milliGRAM(s) SubCutaneous every 24 hours  hydrochlorothiazide 12.5 milliGRAM(s) Oral daily  levETIRAcetam 750 milliGRAM(s) Oral two times a day  losartan 50 milliGRAM(s) Oral daily    MEDICATIONS  (PRN):  acetaminophen     Tablet .. 650 milliGRAM(s) Oral every 6 hours PRN Temp greater or equal to 38C (100.4F), Mild Pain (1 - 3)  aluminum hydroxide/magnesium hydroxide/simethicone Suspension 30 milliLiter(s) Oral every 4 hours PRN Dyspepsia  LORazepam   Injectable 1 milliGRAM(s) IV Push every 6 hours PRN seizure  melatonin 3 milliGRAM(s) Oral at bedtime PRN Insomnia  ondansetron Injectable 4 milliGRAM(s) IV Push every 8 hours PRN Nausea and/or Vomiting      Vital Signs Last 24 Hrs  T(C): 36.4 (27 Feb 2024 11:14), Max: 36.9 (26 Feb 2024 15:46)  T(F): 97.5 (27 Feb 2024 11:14), Max: 98.4 (26 Feb 2024 15:46)  HR: 70 (27 Feb 2024 11:14) (60 - 73)  BP: 105/69 (27 Feb 2024 11:14) (105/69 - 144/81)  BP(mean): --  RR: 18 (27 Feb 2024 04:47) (18 - 18)  SpO2: 95% (27 Feb 2024 11:14) (94% - 98%)    Parameters below as of 27 Feb 2024 11:14  Patient On (Oxygen Delivery Method): room air      Detailed Neurologic Exam:    Mental status: The patient is awake and alert and has normal attention span.  The patient is fully oriented in 3 spheres.  The patient is able to name objects, follow commands, repeat sentences.    Cranial nerves: Pupils equal and react symmetrically to light. There is no visual field deficit to confrontation. Extraocular motion is full with no nystagmus. There is no ptosis. Facial sensation is intact. Facial musculature is symmetric. Palate elevates symmetrically. Tongue is midline.    Motor: There is normal bulk and tone.  There is no tremor.  Strength is 5/5 in the right arm and leg.   Strength is 5/5 in the left arm and leg.    Sensation: Intact to light touch and pin in 4 extremities    Reflexes: 2+ throughout and plantar responses are flexor.    Cerebellar: There is no dysmetria on finger to nose testing.    Gait : deferred    LABS:                         14.6   8.47  )-----------( 146      ( 26 Feb 2024 07:45 )             42.1     02-26    135  |  99  |  12.2  ----------------------------<  132<H>  4.1   |  22.0  |  1.09    Ca    9.1      26 Feb 2024 07:45    TPro  6.6  /  Alb  4.2  /  TBili  0.6  /  DBili  x   /  AST  36  /  ALT  33  /  AlkPhos  96  02-26    LIVER FUNCTIONS - ( 26 Feb 2024 07:45 )  Alb: 4.2 g/dL / Pro: 6.6 g/dL / ALK PHOS: 96 U/L / ALT: 33 U/L / AST: 36 U/L / GGT: x           PT/INR - ( 26 Feb 2024 08:20 )   PT: 11.6 sec;   INR: 1.05 ratio         PTT - ( 26 Feb 2024 08:20 )  PTT:45.3 sec    EEG SUMMARY 2/26-2/27/24:  Abnormal EEG in the awake, drowsy and asleep states.  -	One electrographic seizure arising from the right frontotemporal region.   -	Occasional sharp waves seen over the right frontotemporal region, with maximal negativity at F8.   -	Intermittent irregular slowing, focal, bi-temporal (right > left).      -------------------------------------------------------------------------------------------------------------------------------------------------------  IMPRESSION/CLINICAL CORRELATE:  This is an abnormal EEG record.   1.	One electrographic focal onset seizure arising from the right frontotemporal region.   2.	Increased risk of seizures arising from the right frontotemporal region.  3.	Functional cerebral dysfunction seen in the bi-temporal regions (R>L)    RADIOLOGY & ADDITIONAL STUDIES (independently reviewed unless otherwise noted):  CT Head No Cont (02.26.24 @ 08:20)   IMPRESSION: No evidence of an acute transcortical infarction or   hemorrhage.    
CC: Follow up     INTERVAL HPI/OVERNIGHT EVENTS: Patient seen and examined, sitting up in a chair. No acute complaints.       Vital Signs Last 24 Hrs  T(C): 36.4 (2024 11:14), Max: 36.9 (2024 15:46)  T(F): 97.5 (2024 11:14), Max: 98.4 (2024 15:46)  HR: 70 (2024 11:14) (60 - 73)  BP: 105/69 (2024 11:14) (105/69 - 144/81)  BP(mean): --  RR: 18 (2024 04:47) (18 - 18)  SpO2: 95% (2024 11:14) (94% - 98%)    Parameters below as of 2024 11:14  Patient On (Oxygen Delivery Method): room air        PHYSICAL EXAM:    GENERAL: NAD, AOX3  ENMT: Moist mucous membranes  NECK: Supple  NERVOUS SYSTEM:  Alert & Oriented X3, Motor Strength 5/5 B/L upper and lower extremities  CHEST/LUNG: Clear to auscultation bilaterally; No rales, rhonchi, wheezing, or rubs  HEART: Regular rate and rhythm; No murmurs, rubs, or gallops  ABDOMEN: Soft, Nontender, Nondistended; Bowel sounds present  EXTREMITIES:  2+ Peripheral Pulses, No clubbing, cyanosis, or edema        MEDICATIONS  (STANDING):  carvedilol 12.5 milliGRAM(s) Oral every 12 hours  cholecalciferol 1000 Unit(s) Oral daily  enoxaparin Injectable 40 milliGRAM(s) SubCutaneous every 24 hours  hydrochlorothiazide 12.5 milliGRAM(s) Oral daily  levETIRAcetam 750 milliGRAM(s) Oral two times a day  losartan 50 milliGRAM(s) Oral daily    MEDICATIONS  (PRN):  acetaminophen     Tablet .. 650 milliGRAM(s) Oral every 6 hours PRN Temp greater or equal to 38C (100.4F), Mild Pain (1 - 3)  aluminum hydroxide/magnesium hydroxide/simethicone Suspension 30 milliLiter(s) Oral every 4 hours PRN Dyspepsia  LORazepam   Injectable 1 milliGRAM(s) IV Push every 6 hours PRN seizure  melatonin 3 milliGRAM(s) Oral at bedtime PRN Insomnia  ondansetron Injectable 4 milliGRAM(s) IV Push every 8 hours PRN Nausea and/or Vomiting      Allergies    No Known Allergies    Intolerances          LABS:                          14.6   8.47  )-----------( 146      ( 2024 07:45 )             42.1         135  |  99  |  12.2  ----------------------------<  132<H>  4.1   |  22.0  |  1.09    Ca    9.1      2024 07:45    TPro  6.6  /  Alb  4.2  /  TBili  0.6  /  DBili  x   /  AST  36  /  ALT  33  /  AlkPhos  96      PT/INR - ( 2024 08:20 )   PT: 11.6 sec;   INR: 1.05 ratio         PTT - ( 2024 08:20 )  PTT:45.3 sec  Urinalysis Basic - ( 2024 08:50 )    Color: Yellow / Appearance: Clear / S.009 / pH: x  Gluc: x / Ketone: Negative mg/dL  / Bili: Negative / Urobili: 0.2 mg/dL   Blood: x / Protein: 30 mg/dL / Nitrite: Negative   Leuk Esterase: Negative / RBC: 1 /HPF / WBC 1 /HPF   Sq Epi: x / Non Sq Epi: 0 /HPF / Bacteria: Negative /HPF        RADIOLOGY & ADDITIONAL TESTS:  
CC: Follow up     INTERVAL HPI/OVERNIGHT EVENTS: Patient seen and examined, sitting up in a chair. no acute complaints overnight.       Vital Signs Last 24 Hrs  T(C): 36.5 (28 Feb 2024 04:42), Max: 37 (27 Feb 2024 20:00)  T(F): 97.7 (28 Feb 2024 04:42), Max: 98.6 (27 Feb 2024 20:00)  HR: 66 (28 Feb 2024 04:42) (60 - 73)  BP: 113/68 (28 Feb 2024 04:42) (105/69 - 121/73)  BP(mean): 78 (28 Feb 2024 00:00) (78 - 87)  RR: 18 (28 Feb 2024 04:42) (18 - 18)  SpO2: 92% (28 Feb 2024 04:42) (92% - 99%)    Parameters below as of 28 Feb 2024 04:42  Patient On (Oxygen Delivery Method): room air        PHYSICAL EXAM:    GENERAL: NAD, AOX3  HEAD:  Atraumatic, Normocephalic  EYES: onjunctiva and sclera clear  ENMT: Moist mucous membranes  CHEST/LUNG: Clear to auscultation bilaterally; No rales, rhonchi, wheezing, or rubs  HEART: Regular rate and rhythm; No murmurs, rubs, or gallops  ABDOMEN: Soft, Nontender, Nondistended; Bowel sounds present  EXTREMITIES:  2+ Peripheral Pulses, No clubbing, cyanosis, or edema        MEDICATIONS  (STANDING):  carvedilol 12.5 milliGRAM(s) Oral every 12 hours  cholecalciferol 1000 Unit(s) Oral daily  enoxaparin Injectable 40 milliGRAM(s) SubCutaneous every 24 hours  hydrochlorothiazide 12.5 milliGRAM(s) Oral daily  levETIRAcetam 750 milliGRAM(s) Oral two times a day  losartan 50 milliGRAM(s) Oral daily    MEDICATIONS  (PRN):  acetaminophen     Tablet .. 650 milliGRAM(s) Oral every 6 hours PRN Temp greater or equal to 38C (100.4F), Mild Pain (1 - 3)  aluminum hydroxide/magnesium hydroxide/simethicone Suspension 30 milliLiter(s) Oral every 4 hours PRN Dyspepsia  LORazepam   Injectable 1 milliGRAM(s) IV Push every 6 hours PRN seizure  melatonin 3 milliGRAM(s) Oral at bedtime PRN Insomnia  ondansetron Injectable 4 milliGRAM(s) IV Push every 8 hours PRN Nausea and/or Vomiting      Allergies    No Known Allergies    Intolerances          LABS:                  RADIOLOGY & ADDITIONAL TESTS:

## 2024-03-04 PROBLEM — I10 ESSENTIAL (PRIMARY) HYPERTENSION: Chronic | Status: ACTIVE | Noted: 2024-02-26

## 2024-03-07 ENCOUNTER — APPOINTMENT (OUTPATIENT)
Dept: NEUROLOGY | Facility: CLINIC | Age: 59
End: 2024-03-07
Payer: MEDICARE

## 2024-03-07 VITALS
HEIGHT: 69 IN | DIASTOLIC BLOOD PRESSURE: 82 MMHG | BODY MASS INDEX: 35.55 KG/M2 | SYSTOLIC BLOOD PRESSURE: 130 MMHG | WEIGHT: 240 LBS

## 2024-03-07 DIAGNOSIS — R56.9 UNSPECIFIED CONVULSIONS: ICD-10-CM

## 2024-03-07 PROCEDURE — G2211 COMPLEX E/M VISIT ADD ON: CPT

## 2024-03-07 PROCEDURE — 99214 OFFICE O/P EST MOD 30 MIN: CPT

## 2024-03-07 RX ORDER — LEVETIRACETAM 750 MG/1
750 TABLET, FILM COATED ORAL
Qty: 180 | Refills: 3 | Status: ACTIVE | COMMUNITY
Start: 2024-03-07 | End: 1900-01-01

## 2024-03-07 NOTE — HISTORY OF PRESENT ILLNESS
[FreeTextEntry1] : Post hospital visit March 7, 2024: This is a 58-year-old man who presents today for hospital follow-up for seizure.  He was seen about 10 days ago at the hospital for first-time seizure.  He had a seizure while sleeping which brought him into the hospital.  He had a second event captured on EEG that originated in the right frontal temporal region.  He did not have any further seizures after starting Keppra but he did have occasional sharp waves over the right frontotemporal region with maximal negativity at F8.  He is here today for follow-up on Keppra 750 mg twice a day tolerating it well.  He was unable to get an MRI at the hospital as he could not tolerate closed MRI.

## 2024-03-07 NOTE — CONSULT LETTER
[Dear  ___] : Dear  [unfilled], [Consult Letter:] : I had the pleasure of evaluating your patient, [unfilled]. [Please see my note below.] : Please see my note below. [Consult Closing:] : Thank you very much for allowing me to participate in the care of this patient.  If you have any questions, please do not hesitate to contact me. [Sincerely,] : Sincerely, [FreeTextEntry3] : Zeus Valverde M.D., Ph.D. DPN-N [facility] at [place] Director, Division of Neurology Director, Comprehensive Stroke Center [facility]

## 2024-03-07 NOTE — PHYSICAL EXAM
[General Appearance - Alert] : alert [General Appearance - In No Acute Distress] : in no acute distress [General Appearance - Well Developed] : well developed [General Appearance - Well Nourished] : well nourished [Person] : oriented to person [Place] : oriented to place [Time] : oriented to time [Remote Intact] : remote memory intact [Registration Intact] : recent registration memory intact [Span Intact] : the attention span was normal [Concentration Intact] : normal concentrating ability [Visual Intact] : visual attention was ~T not ~L decreased [Naming Objects] : no difficulty naming common objects [Repeating Phrases] : no difficulty repeating a phrase [Fluency] : fluency intact [Comprehension] : comprehension intact [Current Events] : adequate knowledge of current events [Past History] : adequate knowledge of personal past history [Cranial Nerves Oculomotor (III)] : extraocular motion intact [Cranial Nerves Optic (II)] : visual acuity intact bilaterally,  visual fields full to confrontation, pupils equal round and reactive to light [Cranial Nerves Facial (VII)] : face symmetrical [Cranial Nerves Trigeminal (V)] : facial sensation intact symmetrically [Cranial Nerves Glossopharyngeal (IX)] : tongue and palate midline [Cranial Nerves Vestibulocochlear (VIII)] : hearing was intact bilaterally [Cranial Nerves Accessory (XI - Cranial And Spinal)] : head turning and shoulder shrug symmetric [Cranial Nerves Hypoglossal (XII)] : there was no tongue deviation with protrusion [Motor Strength] : muscle strength was normal in all four extremities [Motor Tone] : muscle tone was normal in all four extremities [Involuntary Movements] : no involuntary movements were seen [No Muscle Atrophy] : normal bulk in all four extremities [Paresis Pronator Drift Right-Sided] : no pronator drift on the right [Paresis Pronator Drift Left-Sided] : no pronator drift on the left [Motor Strength Upper Extremities Bilaterally] : strength was normal in both upper extremities [Motor Strength Lower Extremities Bilaterally] : strength was normal in both lower extremities [Sensation Tactile Decrease] : light touch was intact [Sensation Pain / Temperature Decrease] : pain and temperature was intact [Sensation Vibration Decrease] : vibration was intact [Proprioception] : proprioception was intact [Abnormal Walk] : normal gait [Balance] : balance was intact [Tremor] : no tremor present [Coordination - Dysmetria Impaired Finger-to-Nose Bilateral] : not present [2+] : Patella left 2+ [Sclera] : the sclera and conjunctiva were normal [PERRL With Normal Accommodation] : pupils were equal in size, round, reactive to light, with normal accommodation [Extraocular Movements] : extraocular movements were intact [No APD] : no afferent pupillary defect [No MARGARITO] : no internuclear ophthalmoplegia [Full Visual Field] : full visual field

## 2024-03-07 NOTE — ASSESSMENT
[FreeTextEntry1] : This is a 58-year-old man with seizure history.  It appears to arise on the right frontal temporal region.  Given the focality of this it is a important that he has an MRI done.  We will do an MRI of his brain with and without contrast.  He needs to have it done in an open MRI machine as he is unable to tolerate lying flat for an MRI due to claustrophobia due to old spinal injuries.  I will call him with these results.  I have renewed his Keppra 750 mg twice a day.  We again went over the fact that he cannot drive or operate heavy machinery for 6 months or until cleared by me.  I will see him back in the office for continued care and medication management of his seizures in 6 months, sooner should the need arise.

## 2024-04-30 RX ORDER — CHOLECALCIFEROL (VITAMIN D3) 25 MCG
25 MCG CAPSULE ORAL
Qty: 90 | Refills: 0 | Status: ACTIVE | COMMUNITY
Start: 2023-08-16 | End: 1900-01-01

## 2024-07-11 ENCOUNTER — APPOINTMENT (OUTPATIENT)
Dept: INTERNAL MEDICINE | Facility: CLINIC | Age: 59
End: 2024-07-11
Payer: MEDICARE

## 2024-07-11 ENCOUNTER — APPOINTMENT (OUTPATIENT)
Dept: INTERNAL MEDICINE | Facility: CLINIC | Age: 59
End: 2024-07-11

## 2024-07-11 VITALS
WEIGHT: 248 LBS | SYSTOLIC BLOOD PRESSURE: 122 MMHG | BODY MASS INDEX: 36.73 KG/M2 | HEART RATE: 79 BPM | DIASTOLIC BLOOD PRESSURE: 82 MMHG | HEIGHT: 69 IN | OXYGEN SATURATION: 98 %

## 2024-07-11 DIAGNOSIS — Z00.00 ENCOUNTER FOR GENERAL ADULT MEDICAL EXAMINATION W/OUT ABNORMAL FINDINGS: ICD-10-CM

## 2024-07-11 DIAGNOSIS — R73.03 PREDIABETES.: ICD-10-CM

## 2024-07-11 DIAGNOSIS — E55.9 VITAMIN D DEFICIENCY, UNSPECIFIED: ICD-10-CM

## 2024-07-11 DIAGNOSIS — I10 ESSENTIAL (PRIMARY) HYPERTENSION: ICD-10-CM

## 2024-07-11 DIAGNOSIS — R56.9 UNSPECIFIED CONVULSIONS: ICD-10-CM

## 2024-07-11 PROCEDURE — 99396 PREV VISIT EST AGE 40-64: CPT

## 2024-07-11 PROCEDURE — 36415 COLL VENOUS BLD VENIPUNCTURE: CPT

## 2024-07-15 ENCOUNTER — NON-APPOINTMENT (OUTPATIENT)
Age: 59
End: 2024-07-15

## 2024-07-17 ENCOUNTER — TRANSCRIPTION ENCOUNTER (OUTPATIENT)
Age: 59
End: 2024-07-17

## 2024-07-17 LAB
25(OH)D3 SERPL-MCNC: 37 NG/ML
ALP BLD-CCNC: 137 U/L
ALT SERPL-CCNC: 36 U/L
AST SERPL-CCNC: 27 U/L
BASOPHILS NFR BLD AUTO: 0.6 %
BILIRUB SERPL-MCNC: 0.5 MG/DL
BUN SERPL-MCNC: 19 MG/DL
CHLORIDE SERPL-SCNC: 101 MMOL/L
CHOLEST SERPL-MCNC: 185 MG/DL
CO2 SERPL-SCNC: 25 MMOL/L
CREAT SERPL-MCNC: 1.34 MG/DL
EOSINOPHIL NFR BLD AUTO: 2.2 %
ESTIMATED AVERAGE GLUCOSE: 131 MG/DL
GLUCOSE SERPL-MCNC: 115 MG/DL
HBA1C MFR BLD HPLC: 6.2 %
HCT VFR BLD CALC: 44.4 %
HCV AB SER QL: NONREACTIVE
HCV S/CO RATIO: 0.15 S/CO
HDLC SERPL-MCNC: 33 MG/DL
HGB BLD-MCNC: 14.8 G/DL
HIV1+2 AB SPEC QL IA.RAPID: NONREACTIVE
IMM GRANULOCYTES NFR BLD AUTO: 0.9 %
LDLC SERPL CALC-MCNC: 122 MG/DL
LYMPHOCYTES # BLD AUTO: 1.15 K/UL
LYMPHOCYTES NFR BLD AUTO: 17.9 %
MAN DIFF?: NORMAL
MCHC RBC-ENTMCNC: 29.7 PG
MCHC RBC-ENTMCNC: 33.3 GM/DL
MCV RBC AUTO: 89 FL
MONOCYTES # BLD AUTO: 0.65 K/UL
MONOCYTES NFR BLD AUTO: 10.1 %
NEUTROPHILS # BLD AUTO: 4.4 K/UL
NONHDLC SERPL-MCNC: 152 MG/DL
PLATELET # BLD AUTO: 132 K/UL
POTASSIUM SERPL-SCNC: 4.2 MMOL/L
PROT SERPL-MCNC: 7.1 G/DL
RBC # FLD: 14.2 %
SODIUM SERPL-SCNC: 138 MMOL/L
TRIGL SERPL-MCNC: 164 MG/DL
TSH SERPL-ACNC: 2.56 UIU/ML
WBC # FLD AUTO: 6.44 K/UL

## 2024-07-18 ENCOUNTER — RX RENEWAL (OUTPATIENT)
Age: 59
End: 2024-07-18

## 2024-09-05 ENCOUNTER — APPOINTMENT (OUTPATIENT)
Dept: NEUROLOGY | Facility: CLINIC | Age: 59
End: 2024-09-05
Payer: MEDICARE

## 2024-09-05 PROCEDURE — 95819 EEG AWAKE AND ASLEEP: CPT

## 2024-09-05 PROCEDURE — 93040 RHYTHM ECG WITH REPORT: CPT

## 2024-09-16 ENCOUNTER — APPOINTMENT (OUTPATIENT)
Dept: NEUROLOGY | Facility: CLINIC | Age: 59
End: 2024-09-16
Payer: MEDICARE

## 2024-09-16 DIAGNOSIS — R56.9 UNSPECIFIED CONVULSIONS: ICD-10-CM

## 2024-09-16 PROCEDURE — 99213 OFFICE O/P EST LOW 20 MIN: CPT

## 2024-09-16 PROCEDURE — G2211 COMPLEX E/M VISIT ADD ON: CPT

## 2024-09-16 RX ORDER — LACOSAMIDE 50 MG/1
50 TABLET ORAL
Qty: 60 | Refills: 5 | Status: ACTIVE | COMMUNITY
Start: 2024-09-16 | End: 1900-01-01

## 2024-09-16 NOTE — CONSULT LETTER
[Dear  ___] : Dear  [unfilled], [Courtesy Letter:] : I had the pleasure of seeing your patient, [unfilled], in my office today. [Please see my note below.] : Please see my note below. [Consult Closing:] : Thank you very much for allowing me to participate in the care of this patient.  If you have any questions, please do not hesitate to contact me. [Sincerely,] : Sincerely, [FreeTextEntry3] : Zeus Valverde M.D., Ph.D. DPN-N [facility] at [place] Director, Division of Neurology Director, Comprehensive Stroke Center [facility]

## 2024-09-16 NOTE — ASSESSMENT
[FreeTextEntry1] : This is a 59-year-old man with 2 seizures in the past.  At this point he is not happy with the Keppra as it is causing side effects.  I will switch to lacosamide.  We discussed about tapering off seizure medicines I said because he has had events that were captured on EEG it is difficult to take him off at least for several years.  I asked him to call me in a month if he is having side effects to lacosamide we can consider switching it to a different medication.  I will see him back in 6 months for continued care of his seizures, sooner should the need arise.

## 2024-09-16 NOTE — HISTORY OF PRESENT ILLNESS
[FreeTextEntry1] : Post hospital visit March 7, 2024: This is a 58-year-old man who presents today for hospital follow-up for seizure.  He was seen about 10 days ago at the hospital for first-time seizure.  He had a seizure while sleeping which brought him into the hospital.  He had a second event captured on EEG that originated in the right frontal temporal region.  He did not have any further seizures after starting Keppra but he did have occasional sharp waves over the right frontotemporal region with maximal negativity at F8.  He is here today for follow-up on Keppra 750 mg twice a day tolerating it well.  He was unable to get an MRI at the hospital as he could not tolerate closed MRI.    Follow-up September 16, 2024: This is a 59-year-old man who presents today with history of seizure.  He had a EEG in the hospital which showed seizure that originated in the right frontotemporal region.  After starting Keppra he had occasional sharp waves in the right frontotemporal region.  He is not able to tolerate the Keppra at this point.  It is causing multiple side effects.  He had another EEG done in the office on September 5, 2024 which did not show any sharp waves.  He is here today for follow-up.

## 2024-09-16 NOTE — PHYSICAL EXAM

## 2025-01-13 ENCOUNTER — APPOINTMENT (OUTPATIENT)
Dept: INTERNAL MEDICINE | Facility: CLINIC | Age: 60
End: 2025-01-13

## 2025-03-17 ENCOUNTER — APPOINTMENT (OUTPATIENT)
Dept: NEUROLOGY | Facility: CLINIC | Age: 60
End: 2025-03-17
Payer: MEDICARE

## 2025-03-17 VITALS
HEART RATE: 74 BPM | BODY MASS INDEX: 35.55 KG/M2 | DIASTOLIC BLOOD PRESSURE: 84 MMHG | HEIGHT: 69 IN | WEIGHT: 240 LBS | SYSTOLIC BLOOD PRESSURE: 140 MMHG

## 2025-03-17 DIAGNOSIS — R56.9 UNSPECIFIED CONVULSIONS: ICD-10-CM

## 2025-03-17 PROCEDURE — G2211 COMPLEX E/M VISIT ADD ON: CPT

## 2025-03-17 PROCEDURE — 99213 OFFICE O/P EST LOW 20 MIN: CPT

## 2025-03-17 RX ORDER — TOPIRAMATE 50 MG/1
50 TABLET, FILM COATED ORAL
Qty: 60 | Refills: 5 | Status: ACTIVE | COMMUNITY
Start: 2025-03-17 | End: 1900-01-01

## 2025-03-17 RX ORDER — LEVETIRACETAM 750 MG/1
750 TABLET, FILM COATED ORAL
Qty: 60 | Refills: 0 | Status: ACTIVE | COMMUNITY
Start: 2025-03-17 | End: 1900-01-01

## 2025-04-15 ENCOUNTER — RX RENEWAL (OUTPATIENT)
Age: 60
End: 2025-04-15

## 2025-05-19 ENCOUNTER — OFFICE (OUTPATIENT)
Dept: URBAN - METROPOLITAN AREA CLINIC 109 | Facility: CLINIC | Age: 60
Setting detail: OPHTHALMOLOGY
End: 2025-05-19
Payer: COMMERCIAL

## 2025-05-19 ENCOUNTER — RX RENEWAL (OUTPATIENT)
Age: 60
End: 2025-05-19

## 2025-05-19 DIAGNOSIS — H40.013: ICD-10-CM

## 2025-05-19 PROCEDURE — 92014 COMPRE OPH EXAM EST PT 1/>: CPT | Performed by: OPHTHALMOLOGY

## 2025-05-19 PROCEDURE — 92133 CPTRZD OPH DX IMG PST SGM ON: CPT | Performed by: OPHTHALMOLOGY

## 2025-05-19 PROCEDURE — 92083 EXTENDED VISUAL FIELD XM: CPT | Performed by: OPHTHALMOLOGY

## 2025-05-19 ASSESSMENT — KERATOMETRY
OS_K2POWER_DIOPTERS: 43.75
OD_AXISANGLE_DEGREES: 001
OS_K1POWER_DIOPTERS: 43.00
OS_AXISANGLE_DEGREES: 005
OD_K1POWER_DIOPTERS: 43.00
OD_K2POWER_DIOPTERS: 44.00

## 2025-05-19 ASSESSMENT — REFRACTION_MANIFEST
OD_AXIS: 086
OS_SPHERE: +0.50
OD_VA1: 20/20-
OS_AXIS: 94
OS_CYLINDER: -0.50
OD_CYLINDER: -0.50
OD_SPHERE: +0.75

## 2025-05-19 ASSESSMENT — REFRACTION_AUTOREFRACTION
OS_CYLINDER: -1.00
OD_AXIS: 086
OD_CYLINDER: -1.25
OD_SPHERE: +1.25
OS_AXIS: 085
OS_SPHERE: +0.50

## 2025-05-19 ASSESSMENT — REFRACTION_CURRENTRX
OS_OVR_VA: 20/
OD_CYLINDER: -0.50
OS_AXIS: 88
OS_CYLINDER: -0.50
OD_OVR_VA: 20/
OD_SPHERE: -0.50
OD_AXIS: 98
OS_SPHERE: -0.25

## 2025-05-19 ASSESSMENT — CONFRONTATIONAL VISUAL FIELD TEST (CVF)
OS_FINDINGS: FULL
OD_FINDINGS: FULL

## 2025-05-19 ASSESSMENT — TONOMETRY
OS_IOP_MMHG: 14
OD_IOP_MMHG: 17

## 2025-05-19 ASSESSMENT — VISUAL ACUITY
OD_BCVA: 20/25-1
OS_BCVA: 20/50-2

## 2025-05-27 ENCOUNTER — NON-APPOINTMENT (OUTPATIENT)
Age: 60
End: 2025-05-27

## 2025-05-29 ENCOUNTER — APPOINTMENT (OUTPATIENT)
Dept: INTERNAL MEDICINE | Facility: CLINIC | Age: 60
End: 2025-05-29
Payer: MEDICARE

## 2025-05-29 VITALS
HEART RATE: 75 BPM | OXYGEN SATURATION: 97 % | WEIGHT: 240 LBS | SYSTOLIC BLOOD PRESSURE: 122 MMHG | HEIGHT: 69 IN | BODY MASS INDEX: 35.55 KG/M2 | DIASTOLIC BLOOD PRESSURE: 78 MMHG

## 2025-05-29 DIAGNOSIS — R73.03 PREDIABETES.: ICD-10-CM

## 2025-05-29 DIAGNOSIS — E78.5 HYPERLIPIDEMIA, UNSPECIFIED: ICD-10-CM

## 2025-05-29 DIAGNOSIS — D69.6 THROMBOCYTOPENIA, UNSPECIFIED: ICD-10-CM

## 2025-05-29 DIAGNOSIS — I10 ESSENTIAL (PRIMARY) HYPERTENSION: ICD-10-CM

## 2025-05-29 DIAGNOSIS — R56.9 UNSPECIFIED CONVULSIONS: ICD-10-CM

## 2025-05-29 PROCEDURE — 99214 OFFICE O/P EST MOD 30 MIN: CPT

## 2025-05-29 PROCEDURE — G2211 COMPLEX E/M VISIT ADD ON: CPT

## 2025-05-29 PROCEDURE — 36415 COLL VENOUS BLD VENIPUNCTURE: CPT

## 2025-05-30 LAB
ALBUMIN SERPL ELPH-MCNC: 4.5 G/DL
ALP BLD-CCNC: 124 U/L
ALT SERPL-CCNC: 35 U/L
ANION GAP SERPL CALC-SCNC: 13 MMOL/L
AST SERPL-CCNC: 30 U/L
BASOPHILS # BLD AUTO: 0.03 K/UL
BASOPHILS NFR BLD AUTO: 0.4 %
BILIRUB SERPL-MCNC: 0.6 MG/DL
BUN SERPL-MCNC: 16 MG/DL
CALCIUM SERPL-MCNC: 9.6 MG/DL
CHLORIDE SERPL-SCNC: 102 MMOL/L
CHOLEST SERPL-MCNC: 201 MG/DL
CO2 SERPL-SCNC: 22 MMOL/L
CREAT SERPL-MCNC: 1.21 MG/DL
EGFRCR SERPLBLD CKD-EPI 2021: 69 ML/MIN/1.73M2
EOSINOPHIL # BLD AUTO: 0.11 K/UL
EOSINOPHIL NFR BLD AUTO: 1.6 %
ESTIMATED AVERAGE GLUCOSE: 123 MG/DL
GLUCOSE SERPL-MCNC: 108 MG/DL
HBA1C MFR BLD HPLC: 5.9 %
HCT VFR BLD CALC: 44.7 %
HDLC SERPL-MCNC: 42 MG/DL
HGB BLD-MCNC: 14.7 G/DL
IMM GRANULOCYTES NFR BLD AUTO: 0.7 %
LDLC SERPL-MCNC: 145 MG/DL
LYMPHOCYTES # BLD AUTO: 1.37 K/UL
LYMPHOCYTES NFR BLD AUTO: 20.5 %
MAN DIFF?: NORMAL
MCHC RBC-ENTMCNC: 28.8 PG
MCHC RBC-ENTMCNC: 32.9 G/DL
MCV RBC AUTO: 87.5 FL
MONOCYTES # BLD AUTO: 0.7 K/UL
MONOCYTES NFR BLD AUTO: 10.5 %
NEUTROPHILS # BLD AUTO: 4.42 K/UL
NEUTROPHILS NFR BLD AUTO: 66.3 %
NONHDLC SERPL-MCNC: 159 MG/DL
PLATELET # BLD AUTO: 144 K/UL
POTASSIUM SERPL-SCNC: 4.6 MMOL/L
PROT SERPL-MCNC: 7 G/DL
RBC # BLD: 5.11 M/UL
RBC # FLD: 13.7 %
SODIUM SERPL-SCNC: 137 MMOL/L
TRIGL SERPL-MCNC: 75 MG/DL
TSH SERPL-ACNC: 2.41 UIU/ML
WBC # FLD AUTO: 6.68 K/UL

## 2025-07-21 ENCOUNTER — RX RENEWAL (OUTPATIENT)
Age: 60
End: 2025-07-21